# Patient Record
Sex: FEMALE | Race: WHITE | NOT HISPANIC OR LATINO | Employment: OTHER | URBAN - METROPOLITAN AREA
[De-identification: names, ages, dates, MRNs, and addresses within clinical notes are randomized per-mention and may not be internally consistent; named-entity substitution may affect disease eponyms.]

---

## 2017-06-28 DIAGNOSIS — R01.1 HEART MURMUR: Primary | ICD-10-CM

## 2017-07-07 ENCOUNTER — HOSPITAL ENCOUNTER (OUTPATIENT)
Dept: CARDIOLOGY | Facility: HOSPITAL | Age: 72
Discharge: HOME | End: 2017-07-07
Payer: MEDICARE

## 2017-07-07 DIAGNOSIS — R01.1 HEART MURMUR: ICD-10-CM

## 2017-07-07 LAB
AORTIC VALVE STENOSIS: ABNORMAL
DIASTOLIC DYSFUNCTION: YES
ESTIMATED PA SYSTOLIC PRESSURE: 30.88
MITRAL VALVE MOBILITY: NORMAL
RETIRED EF AND QEF - SEE NOTES: 65 (ref 55–65)
TRICUSPID VALVE REGURGITATION: ABNORMAL

## 2017-07-07 PROCEDURE — 93306 TTE W/DOPPLER COMPLETE: CPT | Mod: 26,,, | Performed by: INTERNAL MEDICINE

## 2017-07-07 PROCEDURE — 93306 TTE W/DOPPLER COMPLETE: CPT | Mod: PO

## 2018-09-26 ENCOUNTER — HOSPITAL ENCOUNTER (INPATIENT)
Facility: HOSPITAL | Age: 73
LOS: 3 days | Discharge: HOME | DRG: 871 | End: 2018-09-29
Attending: FAMILY MEDICINE | Admitting: INTERNAL MEDICINE
Payer: MEDICARE

## 2018-09-26 ENCOUNTER — ANESTHESIA (OUTPATIENT)
Dept: SURGERY | Facility: HOSPITAL | Age: 73
DRG: 871 | End: 2018-09-26
Payer: MEDICARE

## 2018-09-26 ENCOUNTER — ANESTHESIA EVENT (OUTPATIENT)
Dept: SURGERY | Facility: HOSPITAL | Age: 73
DRG: 871 | End: 2018-09-26
Payer: MEDICARE

## 2018-09-26 DIAGNOSIS — A41.9 SEVERE SEPSIS WITH ACUTE ORGAN DYSFUNCTION: ICD-10-CM

## 2018-09-26 DIAGNOSIS — N17.9 AKI (ACUTE KIDNEY INJURY): ICD-10-CM

## 2018-09-26 DIAGNOSIS — R65.20 SEVERE SEPSIS WITH ACUTE ORGAN DYSFUNCTION: ICD-10-CM

## 2018-09-26 DIAGNOSIS — A41.9 SEPSIS ASSOCIATED HYPOTENSION: ICD-10-CM

## 2018-09-26 DIAGNOSIS — R78.81 BACTEREMIA: ICD-10-CM

## 2018-09-26 DIAGNOSIS — I95.9 SEPSIS ASSOCIATED HYPOTENSION: ICD-10-CM

## 2018-09-26 DIAGNOSIS — N39.0 E-COLI UTI: ICD-10-CM

## 2018-09-26 DIAGNOSIS — E87.6 HYPOKALEMIA: ICD-10-CM

## 2018-09-26 DIAGNOSIS — A41.9 SEPSIS, DUE TO UNSPECIFIED ORGANISM: Primary | ICD-10-CM

## 2018-09-26 DIAGNOSIS — R78.81 GRAM-NEGATIVE BACTEREMIA: ICD-10-CM

## 2018-09-26 DIAGNOSIS — N30.01 ACUTE CYSTITIS WITH HEMATURIA: ICD-10-CM

## 2018-09-26 DIAGNOSIS — I95.9 HYPOTENSION: ICD-10-CM

## 2018-09-26 DIAGNOSIS — N13.30 HYDRONEPHROSIS, RIGHT: ICD-10-CM

## 2018-09-26 DIAGNOSIS — F32.A DEPRESSION, UNSPECIFIED DEPRESSION TYPE: ICD-10-CM

## 2018-09-26 DIAGNOSIS — N39.0 URINARY TRACT INFECTION WITHOUT HEMATURIA, SITE UNSPECIFIED: ICD-10-CM

## 2018-09-26 DIAGNOSIS — N20.1 OBSTRUCTION OF RIGHT URETEROPELVIC JUNCTION (UPJ) DUE TO STONE: ICD-10-CM

## 2018-09-26 DIAGNOSIS — R78.81 E COLI BACTEREMIA: ICD-10-CM

## 2018-09-26 DIAGNOSIS — B96.20 E-COLI UTI: ICD-10-CM

## 2018-09-26 DIAGNOSIS — B96.20 E COLI BACTEREMIA: ICD-10-CM

## 2018-09-26 DIAGNOSIS — K21.9 GASTROESOPHAGEAL REFLUX DISEASE, ESOPHAGITIS PRESENCE NOT SPECIFIED: ICD-10-CM

## 2018-09-26 DIAGNOSIS — K44.9 HIATAL HERNIA: ICD-10-CM

## 2018-09-26 DIAGNOSIS — I35.0 NONRHEUMATIC AORTIC VALVE STENOSIS: ICD-10-CM

## 2018-09-26 LAB
ALBUMIN SERPL BCP-MCNC: 3.5 G/DL
ALLENS TEST: ABNORMAL
ALP SERPL-CCNC: 289 U/L
ALT SERPL W/O P-5'-P-CCNC: 33 U/L
ANION GAP SERPL CALC-SCNC: 13 MMOL/L
ANISOCYTOSIS BLD QL SMEAR: SLIGHT
AST SERPL-CCNC: 41 U/L
BACTERIA #/AREA URNS AUTO: ABNORMAL /HPF
BASOPHILS NFR BLD: 0 %
BILIRUB SERPL-MCNC: 1 MG/DL
BILIRUB UR QL STRIP: NEGATIVE
BUN SERPL-MCNC: 17 MG/DL
CALCIUM SERPL-MCNC: 8.8 MG/DL
CHLORIDE SERPL-SCNC: 104 MMOL/L
CLARITY UR REFRACT.AUTO: ABNORMAL
CO2 SERPL-SCNC: 20 MMOL/L
COLOR UR AUTO: YELLOW
CREAT SERPL-MCNC: 1.04 MG/DL
CREAT UR-MCNC: 26.7 MG/DL
DELSYS: ABNORMAL
DIFFERENTIAL METHOD: ABNORMAL
EOSINOPHIL NFR BLD: 1 %
ERYTHROCYTE [DISTWIDTH] IN BLOOD BY AUTOMATED COUNT: 15 %
EST. GFR  (AFRICAN AMERICAN): >60 ML/MIN/1.73 M^2
EST. GFR  (NON AFRICAN AMERICAN): 53.4 ML/MIN/1.73 M^2
FLUAV AG SPEC QL IA: NEGATIVE
FLUBV AG SPEC QL IA: NEGATIVE
GLUCOSE SERPL-MCNC: 116 MG/DL
GLUCOSE UR QL STRIP: NEGATIVE
HCO3 UR-SCNC: 20.6 MMOL/L (ref 24–28)
HCT VFR BLD AUTO: 37.5 %
HGB BLD-MCNC: 12.7 G/DL
HGB UR QL STRIP: ABNORMAL
HYALINE CASTS UR QL AUTO: 0 /LPF
KETONES UR QL STRIP: NEGATIVE
LACTATE SERPL-SCNC: 1.2 MMOL/L
LACTATE SERPL-SCNC: 4.1 MMOL/L
LEUKOCYTE ESTERASE UR QL STRIP: ABNORMAL
LYMPHOCYTES NFR BLD: 14 %
MCH RBC QN AUTO: 29.9 PG
MCHC RBC AUTO-ENTMCNC: 33.9 G/DL
MCV RBC AUTO: 88 FL
METAMYELOCYTES NFR BLD MANUAL: 4 %
MICROSCOPIC COMMENT: ABNORMAL
MONOCYTES NFR BLD: 10 %
MYELOCYTES NFR BLD MANUAL: 2 %
NEUTROPHILS NFR BLD: 42 %
NEUTS BAND NFR BLD MANUAL: 27 %
NITRITE UR QL STRIP: NEGATIVE
PCO2 BLDA: 30.4 MMHG (ref 35–45)
PH SMN: 7.44 [PH] (ref 7.35–7.45)
PH UR STRIP: 6 [PH] (ref 5–8)
PLATELET # BLD AUTO: 153 K/UL
PMV BLD AUTO: 10.9 FL
PO2 BLDA: 34 MMHG (ref 40–60)
POC BE: -4 MMOL/L
POC SATURATED O2: 70 % (ref 95–100)
POC TCO2: 22 MMOL/L (ref 24–29)
POTASSIUM SERPL-SCNC: 3.6 MMOL/L
PROT SERPL-MCNC: 6.5 G/DL
PROT UR QL STRIP: ABNORMAL
RBC # BLD AUTO: 4.25 M/UL
RBC #/AREA URNS AUTO: 30 /HPF (ref 0–4)
SAMPLE: ABNORMAL
SITE: ABNORMAL
SODIUM SERPL-SCNC: 137 MMOL/L
SODIUM UR-SCNC: 63 MMOL/L
SP GR UR STRIP: 1.01 (ref 1–1.03)
SPECIMEN SOURCE: NORMAL
TROPONIN I SERPL DL<=0.01 NG/ML-MCNC: <0.012 NG/ML
URN SPEC COLLECT METH UR: ABNORMAL
UROBILINOGEN UR STRIP-ACNC: 1 EU/DL
WBC # BLD AUTO: 1.6 K/UL
WBC #/AREA URNS AUTO: 20 /HPF (ref 0–5)

## 2018-09-26 PROCEDURE — 63600175 PHARM REV CODE 636 W HCPCS: Performed by: STUDENT IN AN ORGANIZED HEALTH CARE EDUCATION/TRAINING PROGRAM

## 2018-09-26 PROCEDURE — 84484 ASSAY OF TROPONIN QUANT: CPT

## 2018-09-26 PROCEDURE — 85007 BL SMEAR W/DIFF WBC COUNT: CPT

## 2018-09-26 PROCEDURE — 0T768DZ DILATION OF RIGHT URETER WITH INTRALUMINAL DEVICE, VIA NATURAL OR ARTIFICIAL OPENING ENDOSCOPIC: ICD-10-PCS | Performed by: STUDENT IN AN ORGANIZED HEALTH CARE EDUCATION/TRAINING PROGRAM

## 2018-09-26 PROCEDURE — 87077 CULTURE AEROBIC IDENTIFY: CPT | Mod: 59

## 2018-09-26 PROCEDURE — 74420 UROGRAPHY RTRGR +-KUB: CPT | Mod: 26,,, | Performed by: STUDENT IN AN ORGANIZED HEALTH CARE EDUCATION/TRAINING PROGRAM

## 2018-09-26 PROCEDURE — 99900035 HC TECH TIME PER 15 MIN (STAT)

## 2018-09-26 PROCEDURE — 25000003 PHARM REV CODE 250: Performed by: FAMILY MEDICINE

## 2018-09-26 PROCEDURE — 87186 SC STD MICRODIL/AGAR DIL: CPT | Mod: 59

## 2018-09-26 PROCEDURE — BT1D1ZZ FLUOROSCOPY OF RIGHT KIDNEY, URETER AND BLADDER USING LOW OSMOLAR CONTRAST: ICD-10-PCS | Performed by: STUDENT IN AN ORGANIZED HEALTH CARE EDUCATION/TRAINING PROGRAM

## 2018-09-26 PROCEDURE — 87086 URINE CULTURE/COLONY COUNT: CPT

## 2018-09-26 PROCEDURE — 80053 COMPREHEN METABOLIC PANEL: CPT

## 2018-09-26 PROCEDURE — 37000008 HC ANESTHESIA 1ST 15 MINUTES: Performed by: STUDENT IN AN ORGANIZED HEALTH CARE EDUCATION/TRAINING PROGRAM

## 2018-09-26 PROCEDURE — 25000003 PHARM REV CODE 250: Performed by: NURSE ANESTHETIST, CERTIFIED REGISTERED

## 2018-09-26 PROCEDURE — 99223 1ST HOSP IP/OBS HIGH 75: CPT | Mod: 25,,, | Performed by: STUDENT IN AN ORGANIZED HEALTH CARE EDUCATION/TRAINING PROGRAM

## 2018-09-26 PROCEDURE — 36000706: Performed by: STUDENT IN AN ORGANIZED HEALTH CARE EDUCATION/TRAINING PROGRAM

## 2018-09-26 PROCEDURE — 87400 INFLUENZA A/B EACH AG IA: CPT

## 2018-09-26 PROCEDURE — 25500020 PHARM REV CODE 255: Performed by: STUDENT IN AN ORGANIZED HEALTH CARE EDUCATION/TRAINING PROGRAM

## 2018-09-26 PROCEDURE — 63600175 PHARM REV CODE 636 W HCPCS: Performed by: FAMILY MEDICINE

## 2018-09-26 PROCEDURE — 37000009 HC ANESTHESIA EA ADD 15 MINS: Performed by: STUDENT IN AN ORGANIZED HEALTH CARE EDUCATION/TRAINING PROGRAM

## 2018-09-26 PROCEDURE — 25000003 PHARM REV CODE 250: Performed by: STUDENT IN AN ORGANIZED HEALTH CARE EDUCATION/TRAINING PROGRAM

## 2018-09-26 PROCEDURE — 94760 N-INVAS EAR/PLS OXIMETRY 1: CPT

## 2018-09-26 PROCEDURE — 85027 COMPLETE CBC AUTOMATED: CPT

## 2018-09-26 PROCEDURE — 96365 THER/PROPH/DIAG IV INF INIT: CPT

## 2018-09-26 PROCEDURE — 27000221 HC OXYGEN, UP TO 24 HOURS

## 2018-09-26 PROCEDURE — 93010 ELECTROCARDIOGRAM REPORT: CPT | Mod: ,,, | Performed by: INTERNAL MEDICINE

## 2018-09-26 PROCEDURE — 52332 CYSTOSCOPY AND TREATMENT: CPT | Mod: RT,,, | Performed by: STUDENT IN AN ORGANIZED HEALTH CARE EDUCATION/TRAINING PROGRAM

## 2018-09-26 PROCEDURE — 36000707: Performed by: STUDENT IN AN ORGANIZED HEALTH CARE EDUCATION/TRAINING PROGRAM

## 2018-09-26 PROCEDURE — 87040 BLOOD CULTURE FOR BACTERIA: CPT | Mod: 59

## 2018-09-26 PROCEDURE — 20000000 HC ICU ROOM

## 2018-09-26 PROCEDURE — 87088 URINE BACTERIA CULTURE: CPT

## 2018-09-26 PROCEDURE — 99285 EMERGENCY DEPT VISIT HI MDM: CPT | Mod: 25

## 2018-09-26 PROCEDURE — 81000 URINALYSIS NONAUTO W/SCOPE: CPT

## 2018-09-26 PROCEDURE — 82803 BLOOD GASES ANY COMBINATION: CPT

## 2018-09-26 PROCEDURE — 63600175 PHARM REV CODE 636 W HCPCS: Performed by: NURSE ANESTHETIST, CERTIFIED REGISTERED

## 2018-09-26 PROCEDURE — 96375 TX/PRO/DX INJ NEW DRUG ADDON: CPT

## 2018-09-26 PROCEDURE — 93005 ELECTROCARDIOGRAM TRACING: CPT

## 2018-09-26 PROCEDURE — 82570 ASSAY OF URINE CREATININE: CPT

## 2018-09-26 PROCEDURE — 84300 ASSAY OF URINE SODIUM: CPT

## 2018-09-26 PROCEDURE — C1769 GUIDE WIRE: HCPCS | Performed by: STUDENT IN AN ORGANIZED HEALTH CARE EDUCATION/TRAINING PROGRAM

## 2018-09-26 PROCEDURE — 76000 FLUOROSCOPY <1 HR PHYS/QHP: CPT | Mod: 26,59,, | Performed by: STUDENT IN AN ORGANIZED HEALTH CARE EDUCATION/TRAINING PROGRAM

## 2018-09-26 PROCEDURE — C2617 STENT, NON-COR, TEM W/O DEL: HCPCS | Performed by: STUDENT IN AN ORGANIZED HEALTH CARE EDUCATION/TRAINING PROGRAM

## 2018-09-26 PROCEDURE — 83605 ASSAY OF LACTIC ACID: CPT | Mod: 91

## 2018-09-26 DEVICE — STENT SET URETERAL 6X26CM: Type: IMPLANTABLE DEVICE | Site: URETER | Status: FUNCTIONAL

## 2018-09-26 RX ORDER — ENOXAPARIN SODIUM 100 MG/ML
40 INJECTION SUBCUTANEOUS EVERY 24 HOURS
Status: DISCONTINUED | OUTPATIENT
Start: 2018-09-26 | End: 2018-09-29 | Stop reason: HOSPADM

## 2018-09-26 RX ORDER — ONDANSETRON 2 MG/ML
4 INJECTION INTRAMUSCULAR; INTRAVENOUS
Status: COMPLETED | OUTPATIENT
Start: 2018-09-26 | End: 2018-09-26

## 2018-09-26 RX ORDER — PROPOFOL 10 MG/ML
VIAL (ML) INTRAVENOUS
Status: DISCONTINUED | OUTPATIENT
Start: 2018-09-26 | End: 2018-09-26

## 2018-09-26 RX ORDER — ENOXAPARIN SODIUM 100 MG/ML
40 INJECTION SUBCUTANEOUS EVERY 24 HOURS
Status: DISCONTINUED | OUTPATIENT
Start: 2018-09-26 | End: 2018-09-26

## 2018-09-26 RX ORDER — PANTOPRAZOLE SODIUM 40 MG/10ML
40 INJECTION, POWDER, LYOPHILIZED, FOR SOLUTION INTRAVENOUS DAILY
Status: DISCONTINUED | OUTPATIENT
Start: 2018-09-26 | End: 2018-09-26

## 2018-09-26 RX ORDER — ONDANSETRON 2 MG/ML
4 INJECTION INTRAMUSCULAR; INTRAVENOUS DAILY PRN
Status: DISCONTINUED | OUTPATIENT
Start: 2018-09-26 | End: 2018-09-29 | Stop reason: HOSPADM

## 2018-09-26 RX ORDER — KETOROLAC TROMETHAMINE 30 MG/ML
15 INJECTION, SOLUTION INTRAMUSCULAR; INTRAVENOUS EVERY 6 HOURS PRN
Status: DISCONTINUED | OUTPATIENT
Start: 2018-09-26 | End: 2018-09-26

## 2018-09-26 RX ORDER — CEFTRIAXONE 1 G/1
1 INJECTION, POWDER, FOR SOLUTION INTRAMUSCULAR; INTRAVENOUS
Status: DISCONTINUED | OUTPATIENT
Start: 2018-09-26 | End: 2018-09-26

## 2018-09-26 RX ORDER — IBUPROFEN 400 MG/1
400 TABLET ORAL EVERY 6 HOURS PRN
Status: DISCONTINUED | OUTPATIENT
Start: 2018-09-26 | End: 2018-09-26

## 2018-09-26 RX ORDER — MORPHINE SULFATE 2 MG/ML
2 INJECTION, SOLUTION INTRAMUSCULAR; INTRAVENOUS EVERY 6 HOURS PRN
Status: DISCONTINUED | OUTPATIENT
Start: 2018-09-26 | End: 2018-09-26

## 2018-09-26 RX ORDER — VENLAFAXINE 37.5 MG/1
150 TABLET ORAL 2 TIMES DAILY
Status: DISCONTINUED | OUTPATIENT
Start: 2018-09-26 | End: 2018-09-29 | Stop reason: HOSPADM

## 2018-09-26 RX ORDER — QUETIAPINE FUMARATE 100 MG/1
100 TABLET, FILM COATED ORAL DAILY
Status: DISCONTINUED | OUTPATIENT
Start: 2018-09-26 | End: 2018-09-27

## 2018-09-26 RX ORDER — ASPIRIN 325 MG
325 TABLET ORAL DAILY
COMMUNITY

## 2018-09-26 RX ORDER — HYDROMORPHONE HYDROCHLORIDE 2 MG/ML
0.5 INJECTION, SOLUTION INTRAMUSCULAR; INTRAVENOUS; SUBCUTANEOUS EVERY 5 MIN PRN
Status: DISCONTINUED | OUTPATIENT
Start: 2018-09-26 | End: 2018-09-29 | Stop reason: HOSPADM

## 2018-09-26 RX ORDER — SODIUM CHLORIDE 0.9 % (FLUSH) 0.9 %
3 SYRINGE (ML) INJECTION
Status: DISCONTINUED | OUTPATIENT
Start: 2018-09-26 | End: 2018-09-29 | Stop reason: HOSPADM

## 2018-09-26 RX ORDER — KETOROLAC TROMETHAMINE 30 MG/ML
15 INJECTION, SOLUTION INTRAMUSCULAR; INTRAVENOUS
Status: COMPLETED | OUTPATIENT
Start: 2018-09-26 | End: 2018-09-26

## 2018-09-26 RX ORDER — VANCOMYCIN HCL IN 5 % DEXTROSE 1G/250ML
1000 PLASTIC BAG, INJECTION (ML) INTRAVENOUS ONCE
Status: COMPLETED | OUTPATIENT
Start: 2018-09-26 | End: 2018-09-26

## 2018-09-26 RX ORDER — PROPOFOL 10 MG/ML
VIAL (ML) INTRAVENOUS CONTINUOUS PRN
Status: DISCONTINUED | OUTPATIENT
Start: 2018-09-26 | End: 2018-09-26

## 2018-09-26 RX ORDER — SODIUM CHLORIDE 9 MG/ML
INJECTION, SOLUTION INTRAVENOUS CONTINUOUS
Status: DISCONTINUED | OUTPATIENT
Start: 2018-09-26 | End: 2018-09-29 | Stop reason: HOSPADM

## 2018-09-26 RX ORDER — LIDOCAINE HCL/PF 100 MG/5ML
SYRINGE (ML) INTRAVENOUS
Status: DISCONTINUED | OUTPATIENT
Start: 2018-09-26 | End: 2018-09-26

## 2018-09-26 RX ORDER — SODIUM CHLORIDE 9 MG/ML
1000 INJECTION, SOLUTION INTRAVENOUS
Status: COMPLETED | OUTPATIENT
Start: 2018-09-26 | End: 2018-09-26

## 2018-09-26 RX ORDER — MORPHINE SULFATE 2 MG/ML
2 INJECTION, SOLUTION INTRAMUSCULAR; INTRAVENOUS EVERY 6 HOURS PRN
Status: DISCONTINUED | OUTPATIENT
Start: 2018-09-26 | End: 2018-09-29 | Stop reason: HOSPADM

## 2018-09-26 RX ORDER — SODIUM CHLORIDE 9 MG/ML
INJECTION, SOLUTION INTRAVENOUS CONTINUOUS PRN
Status: DISCONTINUED | OUTPATIENT
Start: 2018-09-26 | End: 2018-09-26

## 2018-09-26 RX ORDER — ONDANSETRON 2 MG/ML
4 INJECTION INTRAMUSCULAR; INTRAVENOUS EVERY 8 HOURS PRN
Status: DISCONTINUED | OUTPATIENT
Start: 2018-09-26 | End: 2018-09-29 | Stop reason: HOSPADM

## 2018-09-26 RX ORDER — DIPHENHYDRAMINE HYDROCHLORIDE 50 MG/ML
12.5 INJECTION INTRAMUSCULAR; INTRAVENOUS EVERY 6 HOURS PRN
Status: DISCONTINUED | OUTPATIENT
Start: 2018-09-26 | End: 2018-09-29 | Stop reason: HOSPADM

## 2018-09-26 RX ORDER — TRAMADOL HYDROCHLORIDE 50 MG/1
50 TABLET ORAL EVERY 6 HOURS PRN
Status: DISCONTINUED | OUTPATIENT
Start: 2018-09-26 | End: 2018-09-29 | Stop reason: HOSPADM

## 2018-09-26 RX ORDER — VANCOMYCIN HCL IN 5 % DEXTROSE 1G/250ML
PLASTIC BAG, INJECTION (ML) INTRAVENOUS
Status: COMPLETED
Start: 2018-09-26 | End: 2018-09-26

## 2018-09-26 RX ADMIN — ONDANSETRON 4 MG: 2 INJECTION INTRAMUSCULAR; INTRAVENOUS at 07:09

## 2018-09-26 RX ADMIN — PROPOFOL 50 MG: 10 INJECTION, EMULSION INTRAVENOUS at 03:09

## 2018-09-26 RX ADMIN — QUETIAPINE FUMARATE 100 MG: 100 TABLET ORAL at 03:09

## 2018-09-26 RX ADMIN — SODIUM CHLORIDE: 0.9 INJECTION, SOLUTION INTRAVENOUS at 02:09

## 2018-09-26 RX ADMIN — PIPERACILLIN AND TAZOBACTAM 4.5 G: 4; .5 INJECTION, POWDER, LYOPHILIZED, FOR SOLUTION INTRAVENOUS; PARENTERAL at 11:09

## 2018-09-26 RX ADMIN — PIPERACILLIN AND TAZOBACTAM 4.5 G: 4; .5 INJECTION, POWDER, LYOPHILIZED, FOR SOLUTION INTRAVENOUS; PARENTERAL at 03:09

## 2018-09-26 RX ADMIN — PROPOFOL 150 MCG/KG/MIN: 10 INJECTION, EMULSION INTRAVENOUS at 03:09

## 2018-09-26 RX ADMIN — TRAMADOL HYDROCHLORIDE 50 MG: 50 TABLET, COATED ORAL at 08:09

## 2018-09-26 RX ADMIN — ENOXAPARIN SODIUM 40 MG: 100 INJECTION SUBCUTANEOUS at 05:09

## 2018-09-26 RX ADMIN — PIPERACILLIN AND TAZOBACTAM 4.5 G: 4; .5 INJECTION, POWDER, LYOPHILIZED, FOR SOLUTION INTRAVENOUS; PARENTERAL at 07:09

## 2018-09-26 RX ADMIN — PROPOFOL 30 MG: 10 INJECTION, EMULSION INTRAVENOUS at 03:09

## 2018-09-26 RX ADMIN — LIDOCAINE HYDROCHLORIDE 50 MG: 20 INJECTION, SOLUTION INTRAVENOUS at 03:09

## 2018-09-26 RX ADMIN — SODIUM CHLORIDE 1632 ML: 0.9 INJECTION, SOLUTION INTRAVENOUS at 07:09

## 2018-09-26 RX ADMIN — SODIUM CHLORIDE 1000 ML: 0.9 INJECTION, SOLUTION INTRAVENOUS at 09:09

## 2018-09-26 RX ADMIN — SODIUM CHLORIDE: 0.9 INJECTION, SOLUTION INTRAVENOUS at 03:09

## 2018-09-26 RX ADMIN — TRAMADOL HYDROCHLORIDE 50 MG: 50 TABLET, COATED ORAL at 03:09

## 2018-09-26 RX ADMIN — VENLAFAXINE 150 MG: 37.5 TABLET ORAL at 08:09

## 2018-09-26 RX ADMIN — KETOROLAC TROMETHAMINE 15 MG: 30 INJECTION, SOLUTION INTRAMUSCULAR at 12:09

## 2018-09-26 RX ADMIN — SODIUM CHLORIDE: 0.9 INJECTION, SOLUTION INTRAVENOUS at 08:09

## 2018-09-26 RX ADMIN — ONDANSETRON 4 MG: 2 INJECTION INTRAMUSCULAR; INTRAVENOUS at 10:09

## 2018-09-26 NOTE — ANESTHESIA POSTPROCEDURE EVALUATION
"Anesthesia Post Evaluation    Patient: Viv Srivastava    Procedure(s) Performed: Procedure(s) (LRB):  CYSTOSCOPY, WITH Right URETERAL STENT INSERTION, Right Retrograde Pyelogram (Right)    Final Anesthesia Type: general  Patient location during evaluation: PACU  Patient participation: Yes- Able to Participate  Level of consciousness: awake and alert, oriented and awake  Post-procedure vital signs: reviewed and stable  Pain management: adequate  Airway patency: patent  PONV status at discharge: No PONV  Anesthetic complications: no      Cardiovascular status: blood pressure returned to baseline  Respiratory status: unassisted and room air  Hydration status: euvolemic  Follow-up not needed.        Visit Vitals  BP (!) 152/86   Pulse 75   Temp 36.5 °C (97.7 °F) (Oral)   Resp 17   Ht 5' 7" (1.702 m)   Wt 60.9 kg (134 lb 4.2 oz)   SpO2 96%   Breastfeeding? No   BMI 21.03 kg/m²       Pain/Maria Eugenia Score: Pain Assessment Performed: Yes (9/26/2018  5:07 PM)  Presence of Pain: denies (9/26/2018  5:07 PM)  Pain Rating Prior to Med Admin: 5 (9/26/2018  3:03 PM)  Pain Rating Post Med Admin: 2 (9/26/2018  3:33 PM)        "

## 2018-09-26 NOTE — TRANSFER OF CARE
"Anesthesia Transfer of Care Note    Patient: Viv Srivastava    Procedure(s) Performed: Procedure(s) (LRB):  CYSTOSCOPY, WITH Right URETERAL STENT INSERTION, Right Retrograde Pyelogram (Right)    Patient location: ICU    Anesthesia Type: MAC    Transport from OR: Transported from OR on room air with adequate spontaneous ventilation. Continuous ECG monitoring in transport. Continuous SpO2 monitoring in transport    Post pain: adequate analgesia    Post assessment: no apparent anesthetic complications    Post vital signs: stable    Level of consciousness: responds to stimulation and sedated (responds to verbal stimuli)    Nausea/Vomiting: no nausea/vomiting    Complications: none    Transfer of care protocol was followed      Last vitals:   Visit Vitals  BP (!) 159/92   Pulse 72   Temp 36.7 °C (98 °F) (Axillary)   Resp 20   Ht 5' 7" (1.702 m)   Wt 60.9 kg (134 lb 4.2 oz)   SpO2 98%   Breastfeeding? No   BMI 21.03 kg/m²     "

## 2018-09-26 NOTE — ASSESSMENT & PLAN NOTE
72 y.o. female with right flank pain, 6mm proximal ureteral stone, proximal hydroureteronephrosis, abnormal WBC count, abnormal urinalysis suspicious for a UTI    Plan:  1. I have explained the indication and benefits of proceeding with a cystoscopy, right ureteral stent placement, possible retrograde pyelogram with me in the operating room. Alternatives of the procedure were also discussed which included continued observation, IV fluids and antibiotics. The risks of waiting include worsening infection, sepsis, and end organ damage.      The risks of the procedure included but were not limited to pain, infection (urinary tract infection), bleeding (hematuria), ureteral or urethral stricture,  injury to the urethra, bladder, ureter, pain, and discomfort related to the stent were discussed in depth with the patient.      The ureteral stent was discussed at length. It is meant to be only a temporizing measure until we proceed with definitive stone management. If left indwelling, the sequelae include pain, infection, lower urinary tract symptoms, development of calcifications on the ureteral stent, worsening kidney function, and complete loss of kidney function.      2. Continue to monitor for signs/sx of sepsis postop.  3. followup urine culture.  4. Will place a brush after stent procedure that will likely be able to be removed prior to discharge.

## 2018-09-26 NOTE — OP NOTE
OPERATIVE DICTATION  DATE OF OPERATION: 9/26/2018    SERVICE: Urology    SURGEONS:  1. Irma Gill MD    ANESTHESIA:  Anesthesiologist: Logan Nair MD  CRNA: Francine Avalos CRNA    STAFF:  * No surgical staff found *    ANESTHESIA: Local MAC    PREOPERATIVE DIAGNOSIS: Pre-Op Diagnosis Codes:     * right proximal ureteral obstructing calculus     * Hydronephrosis, right [N13.30]     * Hypotension [I95.9]     * Sepsis, due to unspecified organism [A41.9]     * Urinary tract infection without hematuria, site unspecified [N39.0]    POSTOPERATIVE DIAGNOSIS: Post-Op Diagnosis Codes:     * right proximal ureteral obstructing calculus     * Hydronephrosis, right [N13.30]     * Hypotension [I95.9]     * Sepsis, due to unspecified organism [A41.9]     * Urinary tract infection without hematuria, site unspecified [N39.0]    PROCEDURES:   1. Cystoscopy, right 6 Puerto Rican x 26 cm JJ ureteral stent placement OFF A STRING  2. Cystoscopy, right retrograde pyelogram  3. Fluoroscopy < 1hour  4. Interpretation of fluoroscopic images  5. Placement of 16 Puerto Rican urethral brush      COMPLICATIONS: * No complications entered in OR log *    DRAINS: None    TUBES: 16 Puerto Rican urethral brush catheter    IMPLANTS: right 6 Puerto Rican x 26 cm JJ ureteral stent OFF A STRING    FLUIDS: see anesthesia record     ESTIMATED BLOOD LOSS: None    FINDINGS:   1. Right lower riding kidney, ureteral calculus not evidence on fluoroscopy.  2. Right retrograde pyelogram confirmed that guidewire was coiled in the right renal pelvis  3. Right ureteral stent placed without any difficulty    SPECIMEN(S):   None    CONDITION: stable    INDICATIONS FOR THE PROCEDURE:  72 y.o. female with past medical history below with her first episode of kidney stones. She states she has never had kidney stones to her knowledge prior. She was experiencing worsening right flank pain and presented to [facility] ER. She also notes subjective chills.     Obstruction of right  ureteropelvic junction (UPJ) due to stone     72 y.o. female with right flank pain, 6mm proximal ureteral stone, proximal hydroureteronephrosis, abnormal WBC count, abnormal urinalysis suspicious for a UTI     Plan:  1. I have explained the indication and benefits of proceeding with a cystoscopy, right ureteral stent placement, possible retrograde pyelogram with me in the operating room. Alternatives of the procedure were also discussed which included continued observation, IV fluids and antibiotics. The risks of waiting include worsening infection, sepsis, and end organ damage.       The risks of the procedure included but were not limited to pain, infection (urinary tract infection), bleeding (hematuria), ureteral or urethral stricture,  injury to the urethra, bladder, ureter, pain, and discomfort related to the stent were discussed in depth with the patient.       The ureteral stent was discussed at length. It is meant to be only a temporizing measure until we proceed with definitive stone management. If left indwelling, the sequelae include pain, infection, lower urinary tract symptoms, development of calcifications on the ureteral stent, worsening kidney function, and complete loss of kidney function.      2. Continue to monitor for signs/sx of sepsis postop.  3. followup urine culture.  4. Will place a brush after stent procedure that will likely be able to be removed prior to discharge.      PROCEDURE IN DETAIL:  After appropriate informed consent was obtained, the patient was taken to the operating room and placed in the supine position. After induction of General, she was placed in the dorsal lithotomy position.  Then she was prepped and draped in the usual sterile fashion.     Thereafter a WHO timeout was performed and the procedure was initiated. The rigid 22 Croatian cystoscope was inserted into the bladder via the urethra.     I visualized the right ureteral orifice and cannulated it with a Motion  guidewire. I advanced the wire until I noted a coil in the renal pelvis fluoroscopically.     The coil in the right renal pelvis appeared to be consistent with a low riding kidney however I proceeded with a light retrograde pyelogram on the right to confirm that the guidewire was in the right renal pelvis. To perform the  right retrograde pyelogram I injected dilute isovue through a 5 Salvadorean open ended catheter that I guided into the right renal pelvis over the guidewire. The right retrograde pyelogram revealed that the guidewire was in the right renal pelvis and demonstrated hydronephrosis. The guidewire was re-advanced through the 5 Salvadorean open ended catheter and the 5 frenhc was removed.     Then we proceeded with the right ureteral stent placement. A 6 Senegalese x 26 cm JJ ureteral stent was advanced over the guidewire. Using the radiopaque marker of the pusher the stent was positioned in the correct location. As the guidewire was being removed, the right proximal coil in the renal pelvis was formed and visualized fluoroscopically. A coil in the bladder was also noted fluoroscopically.     A 16 Salvadorean urethral brush was inserted into the bladder and inflated with 10cc of sterile saline and this concluded the procedure.     ATTENDING ATTESTATION  I was present and scrubbed for the entire duration  of the procedure.      CASE DURATION:  * Missing case tracking time(s) *    DISPOSITION:   The patient tolerated the procedure well, she was extubated, and taken to post-anesthesia care unit in satisfactory condition.  The patient will be transferred back to her primary team, I will continue to monitor her pain improvement, vital signs, and labs. Likely once her condition improves the urethral brush can be removed prior to discharge. I will make plans for her to meet up with me in the clinic to discuss definitive stone management.     Irma Gill MD

## 2018-09-26 NOTE — ANESTHESIA PREPROCEDURE EVALUATION
09/26/2018  Viv Srivastava is a 72 y.o., female presents for cysto under GA.    Closed fracture of proximal end of left humerus   Severe sepsis with acute organ dysfunction   Hydronephrosis, right   Acute cystitis with hematuria   Obstruction of right ureteropelvic junction (UPJ) due to stone   Depression   YESSICA (acute kidney injury)   Sepsis associated hypotension   Severe sepsis         Anesthesia Evaluation    I have reviewed the Patient Summary Reports.    I have reviewed the Nursing Notes.   I have reviewed the Medications.     Review of Systems  Anesthesia Hx:  No problems with previous Anesthesia    Hematology/Oncology:  Hematology Normal   Oncology Normal     EENT/Dental:EENT/Dental Normal   Cardiovascular:  Cardiovascular Normal     Pulmonary:  Pulmonary Normal    Renal/:   Chronic Renal Disease    Neurological:  Neurology Normal    Psych:   Psychiatric History          Physical Exam  General:  Well nourished    Airway/Jaw/Neck:  Airway Findings: Mouth Opening: Normal Tongue: Normal  General Airway Assessment: Adult  Mallampati: II  TM Distance: Normal, at least 6 cm     Eyes/Ears/Nose:  Eyes/Ears/Nose Findings:    Dental:  Dental Findings:    Chest/Lungs:  Chest/Lungs Findings: Clear to auscultation, Normal Respiratory Rate     Heart/Vascular:  Heart Findings: Rate: Normal  Rhythm: Regular Rhythm        Mental Status:  Mental Status Findings:  Cooperative, Alert and Oriented         Anesthesia Plan  Type of Anesthesia, risks & benefits discussed:  Anesthesia Type:  general  Patient's Preference:   Intra-op Monitoring Plan: standard ASA monitors  Intra-op Monitoring Plan Comments:   Post Op Pain Control Plan: per primary service following discharge from PACU  Post Op Pain Control Plan Comments:   Induction:   IV  Beta Blocker:         Informed Consent: Patient understands risks and agrees with  Anesthesia plan.  Questions answered. Anesthesia consent signed with patient.  ASA Score: 3  emergent   Day of Surgery Review of History & Physical:  There are no significant changes.          Ready For Surgery From Anesthesia Perspective.

## 2018-09-26 NOTE — MEDICAL/APP STUDENT
[facility] Medicine Team A Admission Note - L3 Medical Student  Patient: Viv Srivastava  MRN: [id number]  : 1945  Admitted 18    Subjective  CC: R flank pain and chills    Viv Srivastava is a 71 yo with past medical history of depression who initially presented to our ED morning of  complaining of R sided flank pain. She reports the pain initially began 5 days prior to presentation and temporarily improved 2 days after onset. She reports associated chills, full body aches and 1 episode of green, non-bloody vomit this morning in the ED. Also has been experiencing increased thirst since onset of pain. Denies any change in urine output or color, no visible blood, and denies pain or burning with urination. She has no prior history of kidney stones. On presentation in the ER, CBC, UA and UCx, EKG, CXR, CT of abdomen and pelvis were obtained and patient received IVF and IV vancomycin and zosyn.     PMHx - Depression, Systolic heart murmur  Fam Hx - CVD in mother, sister  Soc Hx - Denied tobacco use, admits past EtOH (amount, duration not clarified) but no current use  Surgical Hx - Tonsillectomy in childhood  Allergies - NKDA  Immunizations - Not up to date on pneumococcal, flu, shingles or tetanus vaccines  Screenings - No recent mammogram, colon cancer screening, pap smear or HPV test  HM - PCP: Lesa    ROS: As above, otherwise  Resp - no chest pain, SOB, or cough  GI - no abdominal pain, nausea or diarrhea; no change in bowel movements   - no vaginal discharge, pain or itching      Objective  Vitals  Temp:  [97.5 °F (36.4 °C)-98.2 °F (36.8 °C)] 98 °F (36.7 °C)  Pulse:  [] 72  Resp:  [16-23] 20  SpO2:  [89 %-100 %] 100 %  BP: ()/(43-88) 160/84    Physical  HENT - EOM intact; missing multiple upper and lower teeth; oropharynx and buccal mucosa non-edematous, non-erythematous and no exudates.   CV - regular rate and rhythm. Grade 2/6 systolic murmur. No rubs or gallops. Peripheral pulses 2+  radially. Cap refill <3 seconds  Resp - no wheezing, accessory muscle use or increased WOB. Fine bibasilar crackles noted.  MSK - No CVA tenderness, normal spine ROM   Abd - soft, nondistended. BS+ and normal. No masses or organomegaly, no TTP  Extremities - no edema or erythema. No TTP  Neuro/psych - alert awake and oriented. Normal affect.   Skin - warm and dry. Appears heavily sun exposed    Meds  Scheduled Meds:   piperacillin-tazobactam (ZOSYN) IVPB  4.5 g Intravenous Q8H    QUEtiapine  100 mg Oral Daily    venlafaxine  150 mg Oral BID     Continuous Infusions:   sodium chloride 0.9% 125 mL/hr at 09/26/18 1423     PRN Meds:.[START ON 9/27/2018] influenza, morphine, omnipaque 300 iohexol, ondansetron, [START ON 9/27/2018] pneumoc 13-dontae conj-dip cr(PF), sodium chloride 0.9%, [START ON 9/27/2018] DIPH,PERTUS (ADACEL),TETANUS PF VAC (ADULT), traMADol    Labs  UA    Specimen UA            Urine...     Specimen UA      Color, UA            Yellow     Color, UA     pH, UA            6.0     pH, UA     Specific Tatums, UA            1.010     Specific Tatums, UA     Appearance, UA            Hazy     Appearance, UA     Protein, UA            2+     Protein, UA     Glucose, UA            Negat...     Glucose, UA     Ketones, UA            Negat...     Ketones, UA     Occult Blood UA            3+     Occult Blood UA     Nitrite, UA            Negat...     Nitrite, UA     Urobilinogen, UA            1.0     Urobilinogen, UA     Bilirubin (UA)            Negat...     Bilirubin (UA)     Leukocytes, UA            3+     Leukocytes, UA     RBC, UA            30     RBC, UA     WBC, UA            20     WBC, UA     Bacteria, UA            Many     Bacteria, UA     Hyaline Casts, UA            0     Hyaline Casts, UA     Microscopic Comment            SEE C...     Microscopic       CBC  WBC            1.60     WBC      RBC            4.25     RBC     Hemoglobin            12.7     Hemoglobin     Hematocrit             37.5     Hematocrit     MCV            88     MCV     MCH            29.9     MCH     MCHC            33.9     MCHC     RDW            15.0     RDW     Platelets            153     Platelets     MPV            10.9     MPV     Gran%            42.0     Gran%     Lymph%            14.0     Lymph%     Mono%            10.0     Mono%     Eosinophil%            1.0     Eosinophil%     Basophil%            0.0     Basophil%     BANDS            27.0     BANDS     Metamyelocytes            4.0     Metamyelocytes     Myelocytes            2.0     Myelocytes     Aniso                     CMP  Sodium            137     Sodium      Potassium            3.6     Potassium     Chloride            104     Chloride     CO2            20     CO2     Anion Gap            13     Anion Gap     BUN, Bld            17     BUN, Bld     Creatinine            1.04     Creatinine     eGFR if non             53.4     eGFR if non      eGFR if             >60.0     eGFR if African American     Glucose            116     Glucose     Calcium            8.8     Calcium     Alkaline Phosphatase            289     Alkaline Phosphatase     Total Protein            6.5     Total Protein     Albumin            3.5     Albumin     Total Bilirubin            1.0     Total Bilirubin     AST            41     AST     ALT                     Troponin I- <0.012  Lactate,venous - 4.1, 743AM --> 1.2, 1119AM   Influenza A, B Ags - Negative    ECG Results          EKG 12-lead (Final result)  Result time 09/26/18 08:35:14    Final result by Interface, Lab In [facility] (09/26/18 08:35:14)                 Narrative:    Test Reason : I95.9,  Blood Pressure : 119/069 mmHG  Vent. Rate : 091 BPM     Atrial Rate : 091 BPM     P-R Int : 154 ms          QRS Dur : 098 ms      QT Int : 376 ms       P-R-T Axes : 058 000 039 degrees     QTc Int : 462 ms    Normal sinus rhythm  Normal ECG  When compared with ECG of 17-FEB-2016  16:06,  Left anterior fascicular block is no longer Present  Criteria for Septal infarct are no longer Present  T wave inversion no longer evident in Inferior leads  Confirmed by Orlando ALLISON, Ramos ([id number]) on 9/26/2018 8:35:07 AM    Referred By: CHANTAL LAMBERT           Confirmed By:Ramos Perry MD                            Imaging  CT Renal Stone/Abd/Pelvis -   1.  Moderate hydronephrosis involves the right kidney with evidence of a nonobstructing stone in the lower pole.  There is a 6 mm calcification inferior 0 medial to the right kidney.  Although poorly defined, I suspect this is secondary to a stone at the ureteropelvic junction.  Follow-up studies recommended.    2.  Moderately large hiatal hernia.    3.  Otherwise as above.  No bowel obstruction or free air.  Follow-up recommended.    CXR-  Heart size is borderline with moderate tortuosity of the thoracic aorta.  Patient is rotated but the lung fields are grossly clear.      Impression       Borderline heart size.  Clear lungs.       Assessment  73 yo F who presents with a 6 day history of R-sided flank pain and associated chills, bilous non-bloody vomiting x1 after presentation in the ED. She is being admitted for treatment of sepsis and potential UTI. Patient is currently stable, afebrile with vitals WNL, and not in pain after receiving IV fluids, IV Vancomycin and Zosyn. NPO for possible ureteral stent placement, urology has been consulted.     Problem List  -Sepsis secondary to UTI  -Hydronephrosis R kidney and nephrolithiasis at UPJ - 5mm stone  -Abnormal UA - Hematuria, bacteruria and presence of WBCs  -YESSICA - GFR of 53.4, BUN/Cr = 16.3  -Lactic Acidosis  -Aortic stenosis  -Leukopenia  -Depression  -Immunizations not up to date    Plan  Sepsis secondary to UTI   -Received Vancomycin and Zosyn in ER - deescalate to Zosyn only; optimize after urine culture/sensitivity returns  -f/u urine and blood cultures  -NS IVF @ 125mL/hr  -monitor vitals      Nausea  -zofran 4mg IV PRN    Pain  -provide pain control with tramadol PO q6PRN, morphine IV q6 PRN    Hydronephrosis and nephrolithiasis  -consulted Urology - they will place ureteral stent in R ureter to allow passage of stone  -patient is NPO for procedure    Health Maintenance  -Vaccinations - patient was counseled to receive vaccinations for S. Pneumonia, influenza, TdaP booster as she is not UTD. Initially consented but later changed her mind and does not want

## 2018-09-26 NOTE — PROGRESS NOTES
Results for CODEY NOVOA (MRN [id number])    Ref. Range 9/26/2018 08:44   POC PH Latest Ref Range: 7.35 - 7.45  7.439   POC PCO2 Latest Ref Range: 35 - 45 mmHg 30.4 (L)   POC PO2 Latest Ref Range: 40 - 60 mmHg 34 (LL)   POC BE Latest Ref Range: -2 to 2 mmol/L -4   POC HCO3 Latest Ref Range: 24 - 28 mmol/L 20.6 (L)   POC SATURATED O2 Latest Ref Range: 95 - 100 % 70 (L)   POC TCO2 Latest Ref Range: 24 - 29 mmol/L 22 (L)   Sample Unknown VENOUS   DelSys Unknown Room Air   Allens Test Unknown N/A   Site Unknown Other     Results reported to Dr. Fierro @ [id number]

## 2018-09-26 NOTE — INTERVAL H&P NOTE
The patient has been examined and the H&P has been reviewed:    I concur with the findings and no changes have occurred since H&P was written.    Anesthesia/Surgery risks, benefits and alternative options discussed and understood by patient/family.          Active Hospital Problems    Diagnosis  POA    *Severe sepsis with acute organ dysfunction [A41.9, R65.20]  Yes    Hydronephrosis, right [N13.30]  Yes    Acute cystitis with hematuria [N30.01]  Yes    Obstruction of right ureteropelvic junction (UPJ) due to stone [N20.1]  Yes    Depression [F32.9]  Yes    YESSICA (acute kidney injury) [N17.9]  Yes    Sepsis associated hypotension [A41.9]  Yes    Severe sepsis [A41.9, R65.20]  Yes      Resolved Hospital Problems   No resolved problems to display.

## 2018-09-26 NOTE — H&P
"[facility] Medicine H&P Note     Admitting Team: [facility] Hospitalist Team A  Attending Physician: Annette Swain MD  Resident: Sonny  Intern: Beatriz    Date of Admit: 9/26/2018    Chief Complaint     Right sided flank pain X 5 days    Subjective:      History of Present Illness:    Patient is a 72 year old female with depression and heart murmur who presented to [facility] ER on morning of 9/26 with complaint of right sided flank pain for 5 days. Patient reports symptoms started on Friday, they waxed and waned over weekend and patient believed she was getting better. However 2 days PTA she began having subjective fever and chills. She states that last night was just a real bad night as far as the pain and chills were going, so she decided to show up to ER in morning. Patient denies any urinary symptoms during this time, including frequency, burning, hematuria, or urgency. Patient does admit to polydipsia during this time. Patient also admits to a sore throat yesterday. Patient denies diarrhea, states last BM 2 days ago was looser than normal. Patient denies any sick contacts.    In ER at [facility] patient was very drowsy and stating "could you just give me a pain pill." Patient initial vitals were 98.2 F, , Resp 20, BP of 59/43. Patient given boluses of total of 2.6 L NS. Was also given dose of Vancomycin and Zosyn. For pain she was given ketorolac. Patient sent to [facility].      Past Medical History:  Past Medical History:   Diagnosis Date    Depression    -Heart murmur    Past Surgical History:  Past Surgical History:   Procedure Laterality Date    TONSILLECTOMY         Allergies:  Review of patient's allergies indicates:  No Known Allergies    Home Medications:  Prior to Admission medications    Medication Sig Start Date End Date Taking? Authorizing Provider   aspirin 325 MG tablet Take 325 mg by mouth once daily.   Yes Historical Provider, MD   quetiapine (SEROQUEL) 100 MG Tab Take by " "mouth every evening.    Historical Provider, MD   venlafaxine (EFFEXOR) 50 MG Tab Take 150 mg by mouth 2 (two) times daily.    Historical Provider, MD       Family History:  Mother-Heart disease at 80  Sister-Heart disease at 60    Social History:  Social History     Tobacco Use    Smoking status: Never Smoker    Smokeless tobacco: Never Used   Substance Use Topics    Alcohol use: No     Frequency: Never     Comment: stopped drinking 3 years ago    Drug use: No       Review of Systems:  All other systems are reviewed and are negative.    Health Maintaince :   Primary Care Physician: [facility]    Immunizations:   TDap indicated now  Flu indicated now  Pna indicated now    Cancer Screening:  PAP: Needs  MMG: Not up to date  Colonoscopy: Not up to date     Objective:   Last 24 Hour Vital Signs:  BP  Min: 59/43  Max: 160/84  Temp  Av.9 °F (36.6 °C)  Min: 97.5 °F (36.4 °C)  Max: 98.2 °F (36.8 °C)  Pulse  Av.4  Min: 71  Max: 124  Resp  Av  Min: 16  Max: 36  SpO2  Av.9 %  Min: 89 %  Max: 100 %  Height  Av' 7" (170.2 cm)  Min: 5' 7" (170.2 cm)  Max: 5' 7" (170.2 cm)  Weight  Av.7 kg (127 lb 2.1 oz)  Min: 54.4 kg (120 lb)  Max: 60.9 kg (134 lb 4.2 oz)  Body mass index is 21.03 kg/m².  No intake/output data recorded.    Physical Examination:  Physical Exam   Constitutional: She is oriented to person, place, and time.   Ill appearing, in mild distress. Poor hygiene   HENT:   Head: Normocephalic and atraumatic.   Nose: Nose normal.   Mouth/Throat: No oropharyngeal exudate.   Tachy MM. Poor dentition   Eyes: Conjunctivae are normal. Pupils are equal, round, and reactive to light. No scleral icterus.   Neck: Normal range of motion. Neck supple. No tracheal deviation present.   Cardiovascular: Normal rate, regular rhythm and intact distal pulses.   +2/6 midsystolic murmur appreciated in 2nd intercostal space of RSB and LSB   Pulmonary/Chest: Effort normal. No respiratory distress. She has no wheezes. "   Bilateral lower lobe crackles   Abdominal: Soft. She exhibits no distension. There is no tenderness. There is no rebound and no guarding.   No CVA tenderness   Musculoskeletal: Normal range of motion. She exhibits no edema or tenderness.   Lymphadenopathy:     She has no cervical adenopathy.   Neurological: She is alert and oriented to person, place, and time. Coordination normal.   Skin: Skin is warm and dry. No rash noted. She is not diaphoretic. No erythema.       Laboratory:  Most Recent Data:  CBC:   Lab Results   Component Value Date    WBC 1.60 (LL) 09/26/2018    HGB 12.7 09/26/2018    HCT 37.5 09/26/2018     09/26/2018    MCV 88 09/26/2018    RDW 15.0 (H) 09/26/2018     WBC Differential: 42 % N, 27 % Bands, 14 % L, 10 % M, 1 % Eo, 0 % Baso  BMP:   Lab Results   Component Value Date     09/26/2018    K 3.6 09/26/2018     09/26/2018    CO2 20 (L) 09/26/2018    BUN 17 09/26/2018    CREATININE 1.04 09/26/2018     (H) 09/26/2018    CALCIUM 8.8 09/26/2018     LFTs:   Lab Results   Component Value Date    PROT 6.5 09/26/2018    ALBUMIN 3.5 09/26/2018    BILITOT 1.0 09/26/2018    AST 41 09/26/2018    ALKPHOS 289 (H) 09/26/2018    ALT 33 09/26/2018     Coags:   Lab Results   Component Value Date    INR 0.9 02/17/2016     FLP: No results found for: CHOL, HDL, LDLCALC, TRIG, CHOLHDL  DM:   Lab Results   Component Value Date    CREATININE 1.04 09/26/2018     Thyroid: No results found for: TSH, FREET4, T6DRHNB, Z7HZOZF, THYROIDAB  Anemia: No results found for: IRON, TIBC, FERRITIN, JYFKXNXQ49, FOLATE  Cardiac:   Lab Results   Component Value Date    TROPONINI <0.012 09/26/2018     02/17/2016     Urinalysis:   Lab Results   Component Value Date    COLORU Yellow 09/26/2018    SPECGRAV 1.010 09/26/2018    NITRITE Negative 09/26/2018    KETONESU Negative 09/26/2018    UROBILINOGEN 1.0 09/26/2018    WBCUA 20 (H) 09/26/2018       Trended Lab Data:  Recent Labs   Lab  09/26/18   0743   WBC   1.60*   HGB  12.7   HCT  37.5   PLT  153   MCV  88   RDW  15.0*   NA  137   K  3.6   CL  104   CO2  20*   BUN  17   CREATININE  1.04   GLU  116*   PROT  6.5   ALBUMIN  3.5   BILITOT  1.0   AST  41   ALKPHOS  289*   ALT  33       Trended Cardiac Data:  Recent Labs   Lab  09/26/18   0743   TROPONINI  <0.012       Microbiology Data:  Blood cx 9/26: pending  Urine Cx 9/26: pending    Other Results:  EKG (my interpretation):  NSR    Radiology:  Imaging Results          CT Renal Stone Study ABD Pelvis WO (Final result)  Result time 09/26/18 08:36:07    Final result by Carlos Liriano III, MD (09/26/18 08:36:07)                 Impression:      1.  Moderate hydronephrosis involves the right kidney with evidence of a nonobstructing stone in the lower pole.  There is a 6 mm calcification inferior 0 medial to the right kidney.  Although poorly defined, I suspect this is secondary to a stone at the ureteropelvic junction.  Follow-up studies recommended.    2.  Moderately large hiatal hernia.    3.  Otherwise as above.  No bowel obstruction or free air.  Follow-up recommended.    All CT scans at this facility are performed  using dose modulation techniques as appropriate to performed exam including the following:  automated exposure control; adjustment of mA and/or kV according to the patients size (this includes techniques or standardized protocols for targeted exams where dose is matched to indication/reason for exam: i.e. extremities or head);  iterative reconstruction technique.      Electronically signed by: Carlos Liriano MD  Date:    09/26/2018  Time:    08:36             Narrative:    EXAMINATION:  CT RENAL STONE STUDY ABD PELVIS WO    CLINICAL HISTORY:  right flank pain;    TECHNIQUE:  Axial CT images performed through the abdomen and pelvis without intravenous contrast. Multiplanar reformats were performed and interpreted.    COMPARISON:  None    FINDINGS:  The heart size is borderline.  There is basilar  scarring and/or atelectatic change.  There is no free intraperitoneal air.  No kwame bowel obstruction.  Bony windows show no acute abnormality.  There is a small pericardial effusion.  Moderately large hiatal hernia noted.  The liver and spleen are unremarkable.  There is no definite adrenal mass or gross enlargement.  Pancreas is poorly defined but there is no discrete mass.  Left kidney shows no obstruction.  Right kidney is enlarged with evidence of moderate hydronephrosis.  There is a 5 mm stone in the mid to lower pole of the right kidney the right renal pelvis is distended although the detail about the pelvis and proximal ureter is somewhat limited, there is a calcification in this region measuring 6 mm in diameter which is suspect represents stone at the ureteropelvic junction.  Borderline bladder wall thickening.    In the right lower quadrant there is no definite inflammatory mass to suggest acute appendicitis.  Moderate volume of stool in the colon.                               X-Ray Chest AP Portable (Final result)  Result time 09/26/18 08:02:51    Final result by Carlos Liriano III, MD (09/26/18 08:02:51)                 Impression:      Borderline heart size.  Clear lungs.      Electronically signed by: Carlos Liriano MD  Date:    09/26/2018  Time:    08:02             Narrative:    EXAMINATION:  XR CHEST AP PORTABLE    CLINICAL HISTORY:  Sepsis;    COMPARISON:  Two thousand sixteen    FINDINGS:  Heart size is borderline with moderate tortuosity of the thoracic aorta.  Patient is rotated but the lung fields are grossly clear.                                 Assessment:     Viv Srivastava is a 72 y.o. female with:  Patient Active Problem List    Diagnosis Date Noted    Severe sepsis with acute organ dysfunction 09/26/2018    Hydronephrosis, right 09/26/2018    Acute cystitis with hematuria 09/26/2018    Obstruction of right ureteropelvic junction (UPJ) due to stone 09/26/2018     Depression 09/26/2018    YESSICA (acute kidney injury) 09/26/2018    Sepsis associated hypotension 09/26/2018    Severe sepsis 09/26/2018    Aortic stenosis 09/26/2018    Closed fracture of proximal end of left humerus 04/04/2016        Plan:     Severe Sepsis 2/2 to Urinary Tract Infection  -Patient with right flank pain X5 days, denies any urinary symptoms however UA showing 3+ leukocytes and 2+ protein and many bacteria  -qSOFA: 2/3  -Patient given boluses of total of 2.6 L NS in ER along with dose of Vancomycin and Zosyn.  -Patient initial WBC 1.6, RR 22, , BP of 59/43  -Initial lactate 4.1 ---> 1.2  -Bcx and Ucx pending  - Will continue Zosyn during hospital stay    Nephrolithiasis with hydronephrosis  -Patient with right flank pain X5 days  -CT Renal shows moderate right hydronephrosis with 6 mm calcification at ureteropelvic junction  -Urology Dr. Gill consulted and plan to place stent in on 9/26  -Will start on pain and nausea regimen    YESSICA  -Baseline Cr 0.60 with GFR > 60 on February 2016  -Creatinine 1.04, GFR 53.4 on 9/26/18  -Prerenal vs postrenal, will check urine Na and Cr  -On continuous NS at 125 ml/hr    Mild Aortic Stenosis  -RSB murmur on exam, patient asymptomatic  -ECHO with SKY of 1.53 cm2 in July 2017    Depression  -Continue home venlafaxine 150 mg BID and Seroquel 100 mg daily    Fluids: NS at 125 ml/hr  Electrolytes: monitor  Nutrition: NPO for procedure  DVT PPx: Heparin after procedure    Dispo: IV antibiotics, ureteral stent    Code Status:     Full    Guillaume Henderson, DO  [facility] HO-1    [facility] Medicine Hospitalist Pager numbers:   [facility] Hospitalist Medicine Team A (Robinson/Brynn): [phone]  [facility] Hospitalist Medicine Team B (Nicole/Eileen):  [phone]

## 2018-09-26 NOTE — HPI
72 y.o. female with past medical history below with her first episode of kidney stones. She states she has never had kidney stones to her knowledge prior. She was experiencing worsening right flank pain and presented to [facility] ER. She also notes subjective chills.     Past Medical History:   Diagnosis Date    Depression      Past Surgical History:   Procedure Laterality Date    TONSILLECTOMY       History reviewed. No pertinent family history.  Social History     Tobacco Use    Smoking status: Never Smoker    Smokeless tobacco: Never Used   Substance Use Topics    Alcohol use: No     Frequency: Never     Comment: stopped drinking 3 years ago    Drug use: No     No current facility-administered medications on file prior to encounter.      Current Outpatient Medications on File Prior to Encounter   Medication Sig Dispense Refill    aspirin 325 MG tablet Take 325 mg by mouth once daily.      quetiapine (SEROQUEL) 100 MG Tab Take by mouth every evening.      venlafaxine (EFFEXOR) 50 MG Tab Take 150 mg by mouth 2 (two) times daily.       Review of patient's allergies indicates:  No Known Allergies

## 2018-09-26 NOTE — SUBJECTIVE & OBJECTIVE
Past Medical History:   Diagnosis Date    Depression        Past Surgical History:   Procedure Laterality Date    TONSILLECTOMY         Review of patient's allergies indicates:  No Known Allergies    Family History     None          Tobacco Use    Smoking status: Never Smoker    Smokeless tobacco: Never Used   Substance and Sexual Activity    Alcohol use: No     Frequency: Never     Comment: stopped drinking 3 years ago    Drug use: No    Sexual activity: Not on file       Review of Systems   Constitutional: Negative for diaphoresis.   HENT: Negative for facial swelling.    Eyes: Negative for visual disturbance.   Respiratory: Negative for shortness of breath.    Cardiovascular: Negative for chest pain.   Gastrointestinal: Negative for abdominal distention.   Musculoskeletal: Negative for gait problem.   Skin: Negative for color change.   Neurological: Negative for speech difficulty.   Hematological: Does not bruise/bleed easily.   Psychiatric/Behavioral: Negative for agitation and behavioral problems.       Objective:     Temp:  [97.5 °F (36.4 °C)-98.2 °F (36.8 °C)] 98 °F (36.7 °C)  Pulse:  [] 72  Resp:  [16-36] 22  SpO2:  [89 %-100 %] 99 %  BP: ()/(43-97) 159/90     Body mass index is 21.03 kg/m².    Date 09/26/18 0700 - 09/27/18 0659   Shift 0576-1913 4345-3128 7796-2926 24 Hour Total   INTAKE   I.V.(mL/kg)  77.1(1.3)  77.1(1.3)   IV Piggyback 1732 100  1832   Shift Total(mL/kg) 1732(28.4) 177.1(2.9)  1909.1(31.3)   OUTPUT   Urine(mL/kg/hr)  400  400   Shift Total(mL/kg)  400(6.6)  400(6.6)   Weight (kg) 60.9 60.9 60.9 60.9          Drains          None          Physical Exam   Constitutional: She is oriented to person, place, and time. She appears well-developed and well-nourished.   HENT:   Head: Normocephalic and atraumatic.   Eyes: EOM are normal. Pupils are equal, round, and reactive to light.   Neck: Normal range of motion. Neck supple.   Cardiovascular: Intact distal pulses.     Pulmonary/Chest: Effort normal. No respiratory distress.   Abdominal: Soft. She exhibits no distension.   Musculoskeletal: Normal range of motion. She exhibits no edema.   Neurological: She is alert and oriented to person, place, and time.   Skin: Skin is warm and dry.     Psychiatric: She has a normal mood and affect. Her behavior is normal.       Significant Labs:    BMP:  Recent Labs   Lab  09/26/18   0743   NA  137   K  3.6   CL  104   CO2  20*   BUN  17   CREATININE  1.04   CALCIUM  8.8       CBC:  Recent Labs   Lab  09/26/18   0743   WBC  1.60*   HGB  12.7   HCT  37.5   PLT  153       All pertinent labs results from the past 24 hours have been reviewed.  Recent Lab Results       09/26/18  1119 09/26/18  0844 09/26/18  0802 09/26/18  0753 09/26/18  0743      Albumin     3.5     Alkaline Phosphatase     289     Allens Test  N/A        ALT     33     Anion Gap     13     Aniso     Slight     Appearance, UA    Hazy      AST     41     Bacteria, UA    Many      BANDS     27.0     Basophil%     0.0     Bilirubin (UA)    Negative      Total Bilirubin     1.0  Comment:  For infants and newborns, interpretation of results should be based  on gestational age, weight and in agreement with clinical  observations.  Premature Infant recommended reference ranges:  Up to 24 hours.............<8.0 mg/dL  Up to 48 hours............<12.0 mg/dL  3-5 days..................<15.0 mg/dL  6-29 days.................<15.0 mg/dL       Site  Other        BUN, Bld     17     Calcium     8.8     Chloride     104     CO2     20     Color, UA    Yellow      Creatinine     1.04     DelSys  Room Air        Differential Method     Manual     eGFR if      >60.0     eGFR if non      53.4  Comment:  Calculation used to obtain the estimated glomerular filtration  rate (eGFR) is the CKD-EPI equation.        Eosinophil%     1.0     Flu A & B Source   Nasopharyngeal Swab       Glucose     116     Glucose, UA     Negative      Gran%     42.0     Hematocrit     37.5     Hemoglobin     12.7     Hyaline Casts, UA    0      Influenza A Ag, EIA   Negative       Influenza B Ag, EIA   Negative       Ketones, UA    Negative      Lactate, Tal 1.2    4.1  Comment:  Lactic Acid critical result(s) called and verbal readback obtained   from Phu Trejo (R.N.) , 09/26/2018 08:16       Leukocytes, UA    3+      Lymph%     14.0     MCH     29.9     MCHC     33.9     MCV     88     Metamyelocytes     4.0     Microscopic Comment    SEE COMMENT  Comment:  Other formed elements not mentioned in the report are not   present in the microscopic examination.         Mono%     10.0     MPV     10.9     Myelocytes     2.0     Nitrite, UA    Negative      Occult Blood UA    3+      pH, UA    6.0      Platelets     153     POC BE  -4        POC HCO3  20.6        POC PCO2  30.4        POC PH  7.439        POC PO2  34        POC SATURATED O2  70        POC TCO2  22        Potassium     3.6     Total Protein     6.5     Protein, UA    2+  Comment:  Recommend a 24 hour urine protein or a urine   protein/creatinine ratio if globulin induced proteinuria is  clinically suspected.        RBC     4.25     RBC, UA    30      RDW     15.0     Sample  VENOUS        Sodium     137     Specific [place], UA    1.010      Specimen UA    Urine, Catheterized      Troponin I     <0.012     Urobilinogen, UA    1.0      WBC, UA    20      WBC     1.60  Comment:  WBC critical result(s) called and verbal readback obtained from   Niki Trejo., 09/26/2018 08:24                       Significant Imaging:  CT scan reviewed - proximal 6mm ureteral calculus with hydroureteronephrosis down to the level of the calcification.

## 2018-09-26 NOTE — H&P (VIEW-ONLY)
[facility]  Urology  Consult Note    Patient Name: Viv Srivastava  MRN: [id number]  Admission Date: 9/26/2018  Hospital Length of Stay: 0   Code Status: Full Code   Attending Provider: Annette Swain MD   Consulting Provider: Irma Gill MD  Primary Care Physician: Mu Mcfadden MD  Principal Problem:Severe sepsis with acute organ dysfunction    Inpatient consult to Urology  Consult performed by: Irma Gill MD  Consult ordered by: Drew Dennis MD  Reason for consult: ureteral calculus; obstruction  Assessment/Recommendations: 72 y.o. female with right flank pain, 6mm proximal ureteral stone, proximal hydroureteronephrosis, abnormal WBC count, abnormal urinalysis suspicious for a UTI    Plan:  1. I have explained the indication and benefits of proceeding with a cystoscopy, right ureteral stent placement, possible retrograde pyelogram with me in the operating room. Alternatives of the procedure were also discussed which included continued observation, IV fluids and antibiotics. The risks of waiting include worsening infection, sepsis, and end organ damage.      The risks of the procedure included but were not limited to pain, infection (urinary tract infection), bleeding (hematuria), ureteral or urethral stricture,  injury to the urethra, bladder, ureter, pain, and discomfort related to the stent were discussed in depth with the patient.      The ureteral stent was discussed at length. It is meant to be only a temporizing measure until we proceed with definitive stone management. If left indwelling, the sequelae include pain, infection, lower urinary tract symptoms, development of calcifications on the ureteral stent, worsening kidney function, and complete loss of kidney function.      2. Continue to monitor for signs/sx of sepsis postop.  3. followup urine culture.  4. Will place a brush after stent procedure that will likely be able to be removed prior to discharge.            Subjective:     HPI:  72 y.o. female with past medical history below with her first episode of kidney stones. She states she has never had kidney stones to her knowledge prior. She was experiencing worsening right flank pain and presented to [facility] ER. She also notes subjective chills.     Past Medical History:   Diagnosis Date    Depression      Past Surgical History:   Procedure Laterality Date    TONSILLECTOMY       History reviewed. No pertinent family history.  Social History     Tobacco Use    Smoking status: Never Smoker    Smokeless tobacco: Never Used   Substance Use Topics    Alcohol use: No     Frequency: Never     Comment: stopped drinking 3 years ago    Drug use: No     No current facility-administered medications on file prior to encounter.      Current Outpatient Medications on File Prior to Encounter   Medication Sig Dispense Refill    aspirin 325 MG tablet Take 325 mg by mouth once daily.      quetiapine (SEROQUEL) 100 MG Tab Take by mouth every evening.      venlafaxine (EFFEXOR) 50 MG Tab Take 150 mg by mouth 2 (two) times daily.       Review of patient's allergies indicates:  No Known Allergies      Past Medical History:   Diagnosis Date    Depression        Past Surgical History:   Procedure Laterality Date    TONSILLECTOMY         Review of patient's allergies indicates:  No Known Allergies    Family History     None          Tobacco Use    Smoking status: Never Smoker    Smokeless tobacco: Never Used   Substance and Sexual Activity    Alcohol use: No     Frequency: Never     Comment: stopped drinking 3 years ago    Drug use: No    Sexual activity: Not on file       Review of Systems   Constitutional: Negative for diaphoresis.   HENT: Negative for facial swelling.    Eyes: Negative for visual disturbance.   Respiratory: Negative for shortness of breath.    Cardiovascular: Negative for chest pain.   Gastrointestinal: Negative for abdominal distention.    Musculoskeletal: Negative for gait problem.   Skin: Negative for color change.   Neurological: Negative for speech difficulty.   Hematological: Does not bruise/bleed easily.   Psychiatric/Behavioral: Negative for agitation and behavioral problems.       Objective:     Temp:  [97.5 °F (36.4 °C)-98.2 °F (36.8 °C)] 98 °F (36.7 °C)  Pulse:  [] 72  Resp:  [16-36] 22  SpO2:  [89 %-100 %] 99 %  BP: ()/(43-97) 159/90     Body mass index is 21.03 kg/m².    Date 09/26/18 0700 - 09/27/18 0659   Shift 0578-6031 3644-9158 7454-6128 24 Hour Total   INTAKE   I.V.(mL/kg)  77.1(1.3)  77.1(1.3)   IV Piggyback 1732 100  1832   Shift Total(mL/kg) 1732(28.4) 177.1(2.9)  1909.1(31.3)   OUTPUT   Urine(mL/kg/hr)  400  400   Shift Total(mL/kg)  400(6.6)  400(6.6)   Weight (kg) 60.9 60.9 60.9 60.9          Drains          None          Physical Exam   Constitutional: She is oriented to person, place, and time. She appears well-developed and well-nourished.   HENT:   Head: Normocephalic and atraumatic.   Eyes: EOM are normal. Pupils are equal, round, and reactive to light.   Neck: Normal range of motion. Neck supple.   Cardiovascular: Intact distal pulses.    Pulmonary/Chest: Effort normal. No respiratory distress.   Abdominal: Soft. She exhibits no distension.   Musculoskeletal: Normal range of motion. She exhibits no edema.   Neurological: She is alert and oriented to person, place, and time.   Skin: Skin is warm and dry.     Psychiatric: She has a normal mood and affect. Her behavior is normal.       Significant Labs:    BMP:  Recent Labs   Lab  09/26/18   0743   NA  137   K  3.6   CL  104   CO2  20*   BUN  17   CREATININE  1.04   CALCIUM  8.8       CBC:  Recent Labs   Lab  09/26/18   0743   WBC  1.60*   HGB  12.7   HCT  37.5   PLT  153       All pertinent labs results from the past 24 hours have been reviewed.  Recent Lab Results       09/26/18  1119 09/26/18  0844 09/26/18  0802 09/26/18  0753 09/26/18  0743      Albumin      3.5     Alkaline Phosphatase     289     Allens Test  N/A        ALT     33     Anion Gap     13     Aniso     Slight     Appearance, UA    Hazy      AST     41     Bacteria, UA    Many      BANDS     27.0     Basophil%     0.0     Bilirubin (UA)    Negative      Total Bilirubin     1.0  Comment:  For infants and newborns, interpretation of results should be based  on gestational age, weight and in agreement with clinical  observations.  Premature Infant recommended reference ranges:  Up to 24 hours.............<8.0 mg/dL  Up to 48 hours............<12.0 mg/dL  3-5 days..................<15.0 mg/dL  6-29 days.................<15.0 mg/dL       Site  Other        BUN, Bld     17     Calcium     8.8     Chloride     104     CO2     20     Color, UA    Yellow      Creatinine     1.04     DelSys  Room Air        Differential Method     Manual     eGFR if      >60.0     eGFR if non      53.4  Comment:  Calculation used to obtain the estimated glomerular filtration  rate (eGFR) is the CKD-EPI equation.        Eosinophil%     1.0     Flu A & B Source   Nasopharyngeal Swab       Glucose     116     Glucose, UA    Negative      Gran%     42.0     Hematocrit     37.5     Hemoglobin     12.7     Hyaline Casts, UA    0      Influenza A Ag, EIA   Negative       Influenza B Ag, EIA   Negative       Ketones, UA    Negative      Lactate, Tal 1.2    4.1  Comment:  Lactic Acid critical result(s) called and verbal readback obtained   from Phu Trejo ([email or web address]) , 09/26/2018 08:16       Leukocytes, UA    3+      Lymph%     14.0     MCH     29.9     MCHC     33.9     MCV     88     Metamyelocytes     4.0     Microscopic Comment    SEE COMMENT  Comment:  Other formed elements not mentioned in the report are not   present in the microscopic examination.         Mono%     10.0     MPV     10.9     Myelocytes     2.0     Nitrite, UA    Negative      Occult Blood UA    3+      pH, UA    6.0      Platelets      153     POC BE  -4        POC HCO3  20.6        POC PCO2  30.4        POC PH  7.439        POC PO2  34        POC SATURATED O2  70        POC TCO2  22        Potassium     3.6     Total Protein     6.5     Protein, UA    2+  Comment:  Recommend a 24 hour urine protein or a urine   protein/creatinine ratio if globulin induced proteinuria is  clinically suspected.        RBC     4.25     RBC, UA    30      RDW     15.0     Sample  VENOUS        Sodium     137     Specific [place], UA    1.010      Specimen UA    Urine, Catheterized      Troponin I     <0.012     Urobilinogen, UA    1.0      WBC, UA    20      WBC     1.60  Comment:  WBC critical result(s) called and verbal readback obtained from   Niki Trejo., 09/26/2018 08:24                       Significant Imaging:  CT scan reviewed - proximal 6mm ureteral calculus with hydroureteronephrosis down to the level of the calcification.                     Assessment and Plan:     Obstruction of right ureteropelvic junction (UPJ) due to stone    72 y.o. female with right flank pain, 6mm proximal ureteral stone, proximal hydroureteronephrosis, abnormal WBC count, abnormal urinalysis suspicious for a UTI    Plan:  1. I have explained the indication and benefits of proceeding with a cystoscopy, right ureteral stent placement, possible retrograde pyelogram with me in the operating room. Alternatives of the procedure were also discussed which included continued observation, IV fluids and antibiotics. The risks of waiting include worsening infection, sepsis, and end organ damage.      The risks of the procedure included but were not limited to pain, infection (urinary tract infection), bleeding (hematuria), ureteral or urethral stricture,  injury to the urethra, bladder, ureter, pain, and discomfort related to the stent were discussed in depth with the patient.      The ureteral stent was discussed at length. It is meant to be only a temporizing measure until we proceed  with definitive stone management. If left indwelling, the sequelae include pain, infection, lower urinary tract symptoms, development of calcifications on the ureteral stent, worsening kidney function, and complete loss of kidney function.      2. Continue to monitor for signs/sx of sepsis postop.  3. followup urine culture.  4. Will place a brush after stent procedure that will likely be able to be removed prior to discharge.             VTE Risk Mitigation (From admission, onward)        Ordered     IP VTE HIGH RISK PATIENT  Once      09/26/18 1418     Place sequential compression device  Until discontinued      09/26/18 1354          Thank you for your consult.      Irma Gill MD  Urology  [facility]

## 2018-09-26 NOTE — PLAN OF CARE
Problem: Patient Care Overview  Goal: Plan of Care Review  Outcome: Ongoing (interventions implemented as appropriate)  VSS after fluid boluses given at [facility]. Stent and catheter placed per urologist. Tolerated procedure well. Clear, yellow urine. Urologist explained to patient the need for follow up after hospitalization to address kidney stone. Pain controlled with medications. Home medications resumed. Monitoring UOP. Safety maintained. Bedrails up x2, call light within reach, son at bedside. Family updated and educated on pt condition.

## 2018-09-26 NOTE — CONSULTS
[facility]  Urology  Consult Note    Patient Name: Viv Srivastava  MRN: [id number]  Admission Date: 9/26/2018  Hospital Length of Stay: 0   Code Status: Full Code   Attending Provider: Annette Swain MD   Consulting Provider: Irma Gill MD  Primary Care Physician: Mu Mcfadden MD  Principal Problem:Severe sepsis with acute organ dysfunction    Inpatient consult to Urology  Consult performed by: Irma Gill MD  Consult ordered by: Drew Dennis MD  Reason for consult: ureteral calculus; obstruction  Assessment/Recommendations: 72 y.o. female with right flank pain, 6mm proximal ureteral stone, proximal hydroureteronephrosis, abnormal WBC count, abnormal urinalysis suspicious for a UTI    Plan:  1. I have explained the indication and benefits of proceeding with a cystoscopy, right ureteral stent placement, possible retrograde pyelogram with me in the operating room. Alternatives of the procedure were also discussed which included continued observation, IV fluids and antibiotics. The risks of waiting include worsening infection, sepsis, and end organ damage.      The risks of the procedure included but were not limited to pain, infection (urinary tract infection), bleeding (hematuria), ureteral or urethral stricture,  injury to the urethra, bladder, ureter, pain, and discomfort related to the stent were discussed in depth with the patient.      The ureteral stent was discussed at length. It is meant to be only a temporizing measure until we proceed with definitive stone management. If left indwelling, the sequelae include pain, infection, lower urinary tract symptoms, development of calcifications on the ureteral stent, worsening kidney function, and complete loss of kidney function.      2. Continue to monitor for signs/sx of sepsis postop.  3. followup urine culture.  4. Will place a brush after stent procedure that will likely be able to be removed prior to discharge.            Subjective:     HPI:  72 y.o. female with past medical history below with her first episode of kidney stones. She states she has never had kidney stones to her knowledge prior. She was experiencing worsening right flank pain and presented to [facility] ER. She also notes subjective chills.     Past Medical History:   Diagnosis Date    Depression      Past Surgical History:   Procedure Laterality Date    TONSILLECTOMY       History reviewed. No pertinent family history.  Social History     Tobacco Use    Smoking status: Never Smoker    Smokeless tobacco: Never Used   Substance Use Topics    Alcohol use: No     Frequency: Never     Comment: stopped drinking 3 years ago    Drug use: No     No current facility-administered medications on file prior to encounter.      Current Outpatient Medications on File Prior to Encounter   Medication Sig Dispense Refill    aspirin 325 MG tablet Take 325 mg by mouth once daily.      quetiapine (SEROQUEL) 100 MG Tab Take by mouth every evening.      venlafaxine (EFFEXOR) 50 MG Tab Take 150 mg by mouth 2 (two) times daily.       Review of patient's allergies indicates:  No Known Allergies      Past Medical History:   Diagnosis Date    Depression        Past Surgical History:   Procedure Laterality Date    TONSILLECTOMY         Review of patient's allergies indicates:  No Known Allergies    Family History     None          Tobacco Use    Smoking status: Never Smoker    Smokeless tobacco: Never Used   Substance and Sexual Activity    Alcohol use: No     Frequency: Never     Comment: stopped drinking 3 years ago    Drug use: No    Sexual activity: Not on file       Review of Systems   Constitutional: Negative for diaphoresis.   HENT: Negative for facial swelling.    Eyes: Negative for visual disturbance.   Respiratory: Negative for shortness of breath.    Cardiovascular: Negative for chest pain.   Gastrointestinal: Negative for abdominal distention.    Musculoskeletal: Negative for gait problem.   Skin: Negative for color change.   Neurological: Negative for speech difficulty.   Hematological: Does not bruise/bleed easily.   Psychiatric/Behavioral: Negative for agitation and behavioral problems.       Objective:     Temp:  [97.5 °F (36.4 °C)-98.2 °F (36.8 °C)] 98 °F (36.7 °C)  Pulse:  [] 72  Resp:  [16-36] 22  SpO2:  [89 %-100 %] 99 %  BP: ()/(43-97) 159/90     Body mass index is 21.03 kg/m².    Date 09/26/18 0700 - 09/27/18 0659   Shift 6535-9439 3556-5949 6342-8840 24 Hour Total   INTAKE   I.V.(mL/kg)  77.1(1.3)  77.1(1.3)   IV Piggyback 1732 100  1832   Shift Total(mL/kg) 1732(28.4) 177.1(2.9)  1909.1(31.3)   OUTPUT   Urine(mL/kg/hr)  400  400   Shift Total(mL/kg)  400(6.6)  400(6.6)   Weight (kg) 60.9 60.9 60.9 60.9          Drains          None          Physical Exam   Constitutional: She is oriented to person, place, and time. She appears well-developed and well-nourished.   HENT:   Head: Normocephalic and atraumatic.   Eyes: EOM are normal. Pupils are equal, round, and reactive to light.   Neck: Normal range of motion. Neck supple.   Cardiovascular: Intact distal pulses.    Pulmonary/Chest: Effort normal. No respiratory distress.   Abdominal: Soft. She exhibits no distension.   Musculoskeletal: Normal range of motion. She exhibits no edema.   Neurological: She is alert and oriented to person, place, and time.   Skin: Skin is warm and dry.     Psychiatric: She has a normal mood and affect. Her behavior is normal.       Significant Labs:    BMP:  Recent Labs   Lab  09/26/18   0743   NA  137   K  3.6   CL  104   CO2  20*   BUN  17   CREATININE  1.04   CALCIUM  8.8       CBC:  Recent Labs   Lab  09/26/18   0743   WBC  1.60*   HGB  12.7   HCT  37.5   PLT  153       All pertinent labs results from the past 24 hours have been reviewed.  Recent Lab Results       09/26/18  1119 09/26/18  0844 09/26/18  0802 09/26/18  0753 09/26/18  0743      Albumin      3.5     Alkaline Phosphatase     289     Allens Test  N/A        ALT     33     Anion Gap     13     Aniso     Slight     Appearance, UA    Hazy      AST     41     Bacteria, UA    Many      BANDS     27.0     Basophil%     0.0     Bilirubin (UA)    Negative      Total Bilirubin     1.0  Comment:  For infants and newborns, interpretation of results should be based  on gestational age, weight and in agreement with clinical  observations.  Premature Infant recommended reference ranges:  Up to 24 hours.............<8.0 mg/dL  Up to 48 hours............<12.0 mg/dL  3-5 days..................<15.0 mg/dL  6-29 days.................<15.0 mg/dL       Site  Other        BUN, Bld     17     Calcium     8.8     Chloride     104     CO2     20     Color, UA    Yellow      Creatinine     1.04     DelSys  Room Air        Differential Method     Manual     eGFR if      >60.0     eGFR if non      53.4  Comment:  Calculation used to obtain the estimated glomerular filtration  rate (eGFR) is the CKD-EPI equation.        Eosinophil%     1.0     Flu A & B Source   Nasopharyngeal Swab       Glucose     116     Glucose, UA    Negative      Gran%     42.0     Hematocrit     37.5     Hemoglobin     12.7     Hyaline Casts, UA    0      Influenza A Ag, EIA   Negative       Influenza B Ag, EIA   Negative       Ketones, UA    Negative      Lactate, Tal 1.2    4.1  Comment:  Lactic Acid critical result(s) called and verbal readback obtained   from Phu Trejo ([email or web address]) , 09/26/2018 08:16       Leukocytes, UA    3+      Lymph%     14.0     MCH     29.9     MCHC     33.9     MCV     88     Metamyelocytes     4.0     Microscopic Comment    SEE COMMENT  Comment:  Other formed elements not mentioned in the report are not   present in the microscopic examination.         Mono%     10.0     MPV     10.9     Myelocytes     2.0     Nitrite, UA    Negative      Occult Blood UA    3+      pH, UA    6.0      Platelets      153     POC BE  -4        POC HCO3  20.6        POC PCO2  30.4        POC PH  7.439        POC PO2  34        POC SATURATED O2  70        POC TCO2  22        Potassium     3.6     Total Protein     6.5     Protein, UA    2+  Comment:  Recommend a 24 hour urine protein or a urine   protein/creatinine ratio if globulin induced proteinuria is  clinically suspected.        RBC     4.25     RBC, UA    30      RDW     15.0     Sample  VENOUS        Sodium     137     Specific [place], UA    1.010      Specimen UA    Urine, Catheterized      Troponin I     <0.012     Urobilinogen, UA    1.0      WBC, UA    20      WBC     1.60  Comment:  WBC critical result(s) called and verbal readback obtained from   Niki Trejo., 09/26/2018 08:24                       Significant Imaging:  CT scan reviewed - proximal 6mm ureteral calculus with hydroureteronephrosis down to the level of the calcification.                     Assessment and Plan:     Obstruction of right ureteropelvic junction (UPJ) due to stone    72 y.o. female with right flank pain, 6mm proximal ureteral stone, proximal hydroureteronephrosis, abnormal WBC count, abnormal urinalysis suspicious for a UTI    Plan:  1. I have explained the indication and benefits of proceeding with a cystoscopy, right ureteral stent placement, possible retrograde pyelogram with me in the operating room. Alternatives of the procedure were also discussed which included continued observation, IV fluids and antibiotics. The risks of waiting include worsening infection, sepsis, and end organ damage.      The risks of the procedure included but were not limited to pain, infection (urinary tract infection), bleeding (hematuria), ureteral or urethral stricture,  injury to the urethra, bladder, ureter, pain, and discomfort related to the stent were discussed in depth with the patient.      The ureteral stent was discussed at length. It is meant to be only a temporizing measure until we proceed  with definitive stone management. If left indwelling, the sequelae include pain, infection, lower urinary tract symptoms, development of calcifications on the ureteral stent, worsening kidney function, and complete loss of kidney function.      2. Continue to monitor for signs/sx of sepsis postop.  3. followup urine culture.  4. Will place a brush after stent procedure that will likely be able to be removed prior to discharge.             VTE Risk Mitigation (From admission, onward)        Ordered     IP VTE HIGH RISK PATIENT  Once      09/26/18 1418     Place sequential compression device  Until discontinued      09/26/18 1354          Thank you for your consult.      Irma Gill MD  Urology  [facility]

## 2018-09-27 PROBLEM — D64.9 NORMOCYTIC ANEMIA: Status: ACTIVE | Noted: 2018-09-27

## 2018-09-27 PROBLEM — D50.9 IRON DEFICIENCY ANEMIA: Status: ACTIVE | Noted: 2018-09-27

## 2018-09-27 PROBLEM — K44.9 HIATAL HERNIA: Status: ACTIVE | Noted: 2018-09-27

## 2018-09-27 LAB
ALBUMIN SERPL BCP-MCNC: 2.4 G/DL
ALP SERPL-CCNC: 124 U/L
ALT SERPL W/O P-5'-P-CCNC: 23 U/L
ANION GAP SERPL CALC-SCNC: 10 MMOL/L
AST SERPL-CCNC: 14 U/L
BASOPHILS # BLD AUTO: ABNORMAL K/UL
BASOPHILS NFR BLD: 0 %
BILIRUB SERPL-MCNC: 0.5 MG/DL
BUN SERPL-MCNC: 16 MG/DL
CALCIUM SERPL-MCNC: 8.4 MG/DL
CHLORIDE SERPL-SCNC: 108 MMOL/L
CO2 SERPL-SCNC: 23 MMOL/L
CREAT SERPL-MCNC: 1 MG/DL
DIFFERENTIAL METHOD: ABNORMAL
EOSINOPHIL # BLD AUTO: ABNORMAL K/UL
EOSINOPHIL NFR BLD: 0 %
ERYTHROCYTE [DISTWIDTH] IN BLOOD BY AUTOMATED COUNT: 15 %
EST. GFR  (AFRICAN AMERICAN): >60 ML/MIN/1.73 M^2
EST. GFR  (NON AFRICAN AMERICAN): 56 ML/MIN/1.73 M^2
FERRITIN SERPL-MCNC: 155 NG/ML
FOLATE SERPL-MCNC: 7.1 NG/ML
GGT SERPL-CCNC: 64 U/L
GLUCOSE SERPL-MCNC: 116 MG/DL
HCT VFR BLD AUTO: 35 %
HGB BLD-MCNC: 11.7 G/DL
IRON SERPL-MCNC: 12 UG/DL
LYMPHOCYTES # BLD AUTO: ABNORMAL K/UL
LYMPHOCYTES NFR BLD: 11 %
MCH RBC QN AUTO: 29.3 PG
MCHC RBC AUTO-ENTMCNC: 33.4 G/DL
MCV RBC AUTO: 88 FL
MONOCYTES # BLD AUTO: ABNORMAL K/UL
MONOCYTES NFR BLD: 5 %
NEUTROPHILS NFR BLD: 51 %
NEUTS BAND NFR BLD MANUAL: 33 %
NRBC BLD-RTO: ABNORMAL /100 WBC
PLATELET # BLD AUTO: 168 K/UL
PLATELET BLD QL SMEAR: ABNORMAL
PMV BLD AUTO: 9.8 FL
POTASSIUM SERPL-SCNC: 4.5 MMOL/L
PROT SERPL-MCNC: 5.8 G/DL
RBC # BLD AUTO: 4 M/UL
RETICS/RBC NFR AUTO: 0.8 %
SATURATED IRON: 4 %
SODIUM SERPL-SCNC: 141 MMOL/L
TOTAL IRON BINDING CAPACITY: 303 UG/DL
TRANSFERRIN SERPL-MCNC: 205 MG/DL
VIT B12 SERPL-MCNC: >2000 PG/ML
WBC # BLD AUTO: 16.91 K/UL

## 2018-09-27 PROCEDURE — 20000000 HC ICU ROOM

## 2018-09-27 PROCEDURE — 85007 BL SMEAR W/DIFF WBC COUNT: CPT

## 2018-09-27 PROCEDURE — 25000003 PHARM REV CODE 250: Performed by: STUDENT IN AN ORGANIZED HEALTH CARE EDUCATION/TRAINING PROGRAM

## 2018-09-27 PROCEDURE — 63600175 PHARM REV CODE 636 W HCPCS: Performed by: STUDENT IN AN ORGANIZED HEALTH CARE EDUCATION/TRAINING PROGRAM

## 2018-09-27 PROCEDURE — 63600175 PHARM REV CODE 636 W HCPCS: Performed by: FAMILY MEDICINE

## 2018-09-27 PROCEDURE — 83540 ASSAY OF IRON: CPT

## 2018-09-27 PROCEDURE — 85045 AUTOMATED RETICULOCYTE COUNT: CPT

## 2018-09-27 PROCEDURE — 82607 VITAMIN B-12: CPT

## 2018-09-27 PROCEDURE — 80053 COMPREHEN METABOLIC PANEL: CPT

## 2018-09-27 PROCEDURE — 82728 ASSAY OF FERRITIN: CPT

## 2018-09-27 PROCEDURE — 85027 COMPLETE CBC AUTOMATED: CPT

## 2018-09-27 PROCEDURE — 36415 COLL VENOUS BLD VENIPUNCTURE: CPT

## 2018-09-27 PROCEDURE — 82977 ASSAY OF GGT: CPT

## 2018-09-27 PROCEDURE — 25000003 PHARM REV CODE 250: Performed by: FAMILY MEDICINE

## 2018-09-27 PROCEDURE — 87040 BLOOD CULTURE FOR BACTERIA: CPT

## 2018-09-27 PROCEDURE — 25000003 PHARM REV CODE 250: Performed by: INTERNAL MEDICINE

## 2018-09-27 PROCEDURE — 82746 ASSAY OF FOLIC ACID SERUM: CPT

## 2018-09-27 RX ORDER — TAMSULOSIN HYDROCHLORIDE 0.4 MG/1
0.4 CAPSULE ORAL DAILY
Status: DISCONTINUED | OUTPATIENT
Start: 2018-09-27 | End: 2018-09-29 | Stop reason: HOSPADM

## 2018-09-27 RX ORDER — LISINOPRIL 10 MG/1
10 TABLET ORAL ONCE
Status: DISCONTINUED | OUTPATIENT
Start: 2018-09-27 | End: 2018-09-27

## 2018-09-27 RX ORDER — LABETALOL HYDROCHLORIDE 5 MG/ML
10 INJECTION, SOLUTION INTRAVENOUS ONCE
Status: COMPLETED | OUTPATIENT
Start: 2018-09-27 | End: 2018-09-27

## 2018-09-27 RX ORDER — LISINOPRIL 10 MG/1
10 TABLET ORAL DAILY
Status: DISCONTINUED | OUTPATIENT
Start: 2018-09-27 | End: 2018-09-28

## 2018-09-27 RX ORDER — FAMOTIDINE 20 MG/1
20 TABLET, FILM COATED ORAL DAILY
Status: DISCONTINUED | OUTPATIENT
Start: 2018-09-27 | End: 2018-09-29 | Stop reason: HOSPADM

## 2018-09-27 RX ORDER — LORAZEPAM 0.5 MG/1
0.5 TABLET ORAL ONCE
Status: COMPLETED | OUTPATIENT
Start: 2018-09-27 | End: 2018-09-27

## 2018-09-27 RX ORDER — QUETIAPINE FUMARATE 25 MG/1
100 TABLET, FILM COATED ORAL NIGHTLY
Status: DISCONTINUED | OUTPATIENT
Start: 2018-09-27 | End: 2018-09-29 | Stop reason: HOSPADM

## 2018-09-27 RX ORDER — FAMOTIDINE 20 MG/1
20 TABLET, FILM COATED ORAL 2 TIMES DAILY
Status: DISCONTINUED | OUTPATIENT
Start: 2018-09-27 | End: 2018-09-27 | Stop reason: DRUGHIGH

## 2018-09-27 RX ORDER — RAMELTEON 8 MG/1
8 TABLET ORAL NIGHTLY PRN
Status: DISCONTINUED | OUTPATIENT
Start: 2018-09-27 | End: 2018-09-29 | Stop reason: HOSPADM

## 2018-09-27 RX ADMIN — TRAMADOL HYDROCHLORIDE 50 MG: 50 TABLET, COATED ORAL at 01:09

## 2018-09-27 RX ADMIN — SODIUM CHLORIDE: 0.9 INJECTION, SOLUTION INTRAVENOUS at 04:09

## 2018-09-27 RX ADMIN — TAMSULOSIN HYDROCHLORIDE 0.4 MG: 0.4 CAPSULE ORAL at 01:09

## 2018-09-27 RX ADMIN — LORAZEPAM 0.5 MG: 0.5 TABLET ORAL at 03:09

## 2018-09-27 RX ADMIN — VENLAFAXINE 150 MG: 37.5 TABLET ORAL at 08:09

## 2018-09-27 RX ADMIN — PIPERACILLIN AND TAZOBACTAM 4.5 G: 4; .5 INJECTION, POWDER, LYOPHILIZED, FOR SOLUTION INTRAVENOUS; PARENTERAL at 08:09

## 2018-09-27 RX ADMIN — SODIUM CHLORIDE: 0.9 INJECTION, SOLUTION INTRAVENOUS at 11:09

## 2018-09-27 RX ADMIN — LABETALOL HYDROCHLORIDE 10 MG: 5 INJECTION, SOLUTION INTRAVENOUS at 03:09

## 2018-09-27 RX ADMIN — ONDANSETRON 4 MG: 2 INJECTION INTRAMUSCULAR; INTRAVENOUS at 08:09

## 2018-09-27 RX ADMIN — FAMOTIDINE 20 MG: 20 TABLET, FILM COATED ORAL at 08:09

## 2018-09-27 RX ADMIN — QUETIAPINE 100 MG: 25 TABLET ORAL at 08:09

## 2018-09-27 RX ADMIN — LISINOPRIL 10 MG: 10 TABLET ORAL at 10:09

## 2018-09-27 RX ADMIN — PIPERACILLIN AND TAZOBACTAM 4.5 G: 4; .5 INJECTION, POWDER, LYOPHILIZED, FOR SOLUTION INTRAVENOUS; PARENTERAL at 11:09

## 2018-09-27 RX ADMIN — QUETIAPINE FUMARATE 100 MG: 100 TABLET ORAL at 08:09

## 2018-09-27 RX ADMIN — TRAMADOL HYDROCHLORIDE 50 MG: 50 TABLET, COATED ORAL at 10:09

## 2018-09-27 RX ADMIN — PIPERACILLIN AND TAZOBACTAM 4.5 G: 4; .5 INJECTION, POWDER, LYOPHILIZED, FOR SOLUTION INTRAVENOUS; PARENTERAL at 03:09

## 2018-09-27 RX ADMIN — ENOXAPARIN SODIUM 40 MG: 100 INJECTION SUBCUTANEOUS at 05:09

## 2018-09-27 NOTE — PROGRESS NOTES
"[facility] Medicine Progress Note    Primary Team: [facility] Hospitalist Team A  Attending Physician: Annette Swain MD  Resident: Sonny  Intern: Beatriz    Subjective:      Patient feeling much improved this morning. No overnight fevers. Patient states pain is totally improved today, and only use tramadol prn yesterday evening after stent was placed by urology. Patient denying any chest pain, SOB, abdominal pain, swelling.     Objective:     Last 24 Hour Vital Signs:  BP  Min: 59/43  Max: 160/84  Temp  Av.9 °F (36.6 °C)  Min: 97.5 °F (36.4 °C)  Max: 98.4 °F (36.9 °C)  Pulse  Av.9  Min: 71  Max: 124  Resp  Av.6  Min: 11  Max: 36  SpO2  Av.1 %  Min: 89 %  Max: 100 %  Height  Av' 7" (170.2 cm)  Min: 5' 7" (170.2 cm)  Max: 5' 7" (170.2 cm)  Weight  Av kg (130 lb 1.5 oz)  Min: 54.4 kg (120 lb)  Max: 61.7 kg (136 lb 0.4 oz)  I/O last 3 completed shifts:  In: 2534.1 [I.V.:702.1; IV Piggyback:1832]  Out: 1075 [Urine:1075]    Physical Examination:  Physical Exam   Constitutional: She is oriented to person, place, and time.   Ill appearing, in mild distress. Poor hygiene   HENT:   Head: Normocephalic and atraumatic.   Nose: Nose normal.   Mouth/Throat: No oropharyngeal exudate.   MMM. Poor dentition   Eyes: Conjunctivae are normal. Pupils are equal, round, and reactive to light. No scleral icterus.   Neck: Normal range of motion. Neck supple. No tracheal deviation present.   Cardiovascular: Normal rate, regular rhythm and intact distal pulses.   +3/6 midsystolic murmur appreciated in 2nd intercostal space of RSB  Pulmonary/Chest: Effort normal. No respiratory distress. She has no wheezes. CTAB  Abdominal: Soft. She exhibits no distension. There is no tenderness. There is no rebound and no guarding.   No CVA tenderness   Musculoskeletal: Normal range of motion. She exhibits no edema or tenderness.   Lymphadenopathy:     She has no cervical adenopathy.   Neurological: She is alert and oriented to " person, place, and time. Coordination normal.   Skin: Skin is warm and dry. No rash noted. She is not diaphoretic. No erythema.          Laboratory:  Laboratory Data Reviewed: yes  Pertinent Findings:  Lab Results   Component Value Date    WBC 16.91 (H) 09/27/2018    HGB 11.7 (L) 09/27/2018    HCT 35.0 (L) 09/27/2018    MCV 88 09/27/2018     09/27/2018     Lab Results   Component Value Date    CREATININE 1.0 09/27/2018    BUN 16 09/27/2018     09/27/2018    K 4.5 09/27/2018     09/27/2018    CO2 23 09/27/2018     Lab Results   Component Value Date    ALT 23 09/27/2018    AST 14 09/27/2018    GGT 64 (H) 09/27/2018    ALKPHOS 124 09/27/2018    BILITOT 0.5 09/27/2018       Microbiology Data Reviewed: yes  Pertinent Findings:  Blood Cx: NGTD  Urine Cx: pending    Other Results:  EKG (my interpretation):  NSR    Radiology Data Reviewed: yes  Pertinent Findings:  CT Renal: 6 mm stone in UP junction    Current Medications:     Infusions:   sodium chloride 0.9% 125 mL/hr at 09/27/18 0439        Scheduled:   enoxaparin  40 mg Subcutaneous Daily    piperacillin-tazobactam (ZOSYN) IVPB  4.5 g Intravenous Q8H    QUEtiapine  100 mg Oral Daily    venlafaxine  150 mg Oral BID        PRN:  diphenhydrAMINE, HYDROmorphone, morphine, ondansetron, ondansetron, sodium chloride 0.9%, sodium chloride 0.9%, traMADol    Antibiotics and Day Number of Therapy:  Zosyn 9/26-present    Lines and Day Number of Therapy:  PIV 9/26-present    Assessment:     Viv Srivastava is a 72 y.o.female with  Patient Active Problem List    Diagnosis Date Noted    Normocytic anemia 09/27/2018    Severe sepsis with acute organ dysfunction 09/26/2018    Hydronephrosis, right 09/26/2018    Acute cystitis with hematuria 09/26/2018    Obstruction of right ureteropelvic junction (UPJ) due to stone 09/26/2018    Depression 09/26/2018    YESSICA (acute kidney injury) 09/26/2018    Sepsis associated hypotension 09/26/2018    Severe sepsis  09/26/2018    Aortic stenosis 09/26/2018    Closed fracture of proximal end of left humerus 04/04/2016        Plan:     Severe Sepsis 2/2 to Urinary Tract Infection  -Patient with right flank pain X5 days, denies any urinary symptoms however UA showing 3+ leukocytes and 2+ protein and many bacteria  -qSOFA: 2/3  -Patient given boluses of total of 2.6 L NS in ER along with dose of Vancomycin and Zosyn.  -Patient initial WBC 1.6, RR 22, , BP of 59/43  -Initial lactate 4.1 ---> 1.2  -Bcx NGTD  Ucx pending  - Will continue Zosyn during hospital stay and wait for sensitivities     Nephrolithiasis with hydronephrosis  -Patient with right flank pain X5 days  -CT Renal shows moderate right hydronephrosis with 6 mm calcification at ureteropelvic junction  -Urology placed stent on 9/27  -For pain giving tramadol 50 mg q6h prn and morphine 2 mg IV q6h prn  -Zofran for nausea     YESSICA  -Baseline Cr 0.60 with GFR > 60 on February 2016  -Creatinine 1.04, GFR 53.4 on 9/26/18  -Prerenal vs postrenal  FeNa 1.8  -On continuous NS at 125 ml/hr     Normocytic Anemia  -On 9/27 Hb 11/Hct 35/ MCV 88  -Likely dilutional secondary to IV fluids  -Will check iron panel, ferritin, B12, and folate    Mild Aortic Stenosis  -RSB murmur on exam, patient asymptomatic  -ECHO with SKY of 1.53 cm2 in July 2017    Hiatal Hernia  -Incidental finding on imaging of large hiatal hernia  -Patient at home does not have symptoms of indigestion  -Complaining of indegestion at this time, will give to famotidine while here in hospital, consider follow up with PCP if long term symptoms occur     Depression  -Continue home venlafaxine 150 mg BID and Seroquel 100 mg daily     Fluids: NS at 125 ml/hr  Electrolytes: monitor  Nutrition: Full liquid diet, regular diet for lunch  DVT PPx: Heparin     Dispo: IV antibiotics     Code Status:      Full      Guillaume Henderson DO  [facility] HO-1    [facility] Medicine Hospitalist Pager numbers:   [facility] Hospitalist  Medicine Team A (Robinson/Brynn): [phone]  [facility] Hospitalist Medicine Team B (Nicole/Eileen):  [phone]

## 2018-09-27 NOTE — PLAN OF CARE
" met with patient in ICU. Attempted to complete discharge assessment. Patient stated " she just wants to get better and will not answer any questions". Case Management will make second attempt.  "

## 2018-09-27 NOTE — PROGRESS NOTES
[facility]  Urology Progress Note    Problems:   Patient Active Problem List   Diagnosis    Closed fracture of proximal end of left humerus    Severe sepsis with acute organ dysfunction    Hydronephrosis, right    Acute cystitis with hematuria    Obstruction of right ureteropelvic junction (UPJ) due to stone    Depression    YESSICA (acute kidney injury)    Sepsis associated hypotension    Severe sepsis    Aortic stenosis    Iron deficiency anemia    Hiatal hernia       Subjective   NAEO. Afebrile. Tolerating po. Patient reports a significant improvement in her pain. She notes that she feeling overall much better today.    Meds:   enoxaparin  40 mg Subcutaneous Daily    famotidine  20 mg Oral Daily    piperacillin-tazobactam (ZOSYN) IVPB  4.5 g Intravenous Q8H    QUEtiapine  100 mg Oral Daily    tamsulosin  0.4 mg Oral Daily    venlafaxine  150 mg Oral BID      sodium chloride 0.9% 125 mL/hr at 09/27/18 1152     diphenhydrAMINE, HYDROmorphone, morphine, ondansetron, ondansetron, sodium chloride 0.9%, sodium chloride 0.9%, traMADol    Temp:  [97.6 °F (36.4 °C)-98.4 °F (36.9 °C)] 97.6 °F (36.4 °C)  Pulse:  [71-87] 84  Resp:  [11-36] 16  SpO2:  [92 %-100 %] 95 %  BP: (106-160)/(68-97) 154/97    I/O last 3 completed shifts:  In: 4134.1 [I.V.:2202.1; IV Piggyback:1932]  Out: 2085 [Urine:2085]    General:  Alert, cooperative, no distress, appears stated age   Head:  Normocephalic, without obvious abnormality, atraumatic   Eyes:  PERRL, conjunctiva/corneas clear   Lungs:   Respirations unlabored    Heart:  Warm and well perfused   Abdomen:   abdomen is soft without significant tenderness, masses, organomegaly or guarding   : Urethral brush draining light yellow urine   Drains: Urinary Catheter (Brush)   Extremities: Extremities normal, atraumatic, no cyanosis or edema   Skin: Skin color, texture, turgor normal, no rashes or lesions   Psych: Appropriate   Neurologic: Non-focal      Recent Results (from the past 24 hour(s))   Sodium, urine, random    Collection Time: 09/26/18  4:27 PM   Result Value Ref Range    Sodium Urine Random 63 20 - 250 mmol/L   Creatinine, urine, random    Collection Time: 09/26/18  4:27 PM   Result Value Ref Range    Creatinine, Random Ur 26.7 15.0 - 325.0 mg/dL   Iron and TIBC    Collection Time: 09/27/18  3:42 AM   Result Value Ref Range    Iron 12 (L) 30 - 160 ug/dL    Transferrin 205 200 - 375 mg/dL    TIBC 303 250 - 450 ug/dL    Saturated Iron 4 (L) 20 - 50 %   Ferritin    Collection Time: 09/27/18  3:42 AM   Result Value Ref Range    Ferritin 155 20.0 - 300.0 ng/mL   CBC auto differential    Collection Time: 09/27/18  3:44 AM   Result Value Ref Range    WBC 16.91 (H) 3.90 - 12.70 K/uL    RBC 4.00 4.00 - 5.40 M/uL    Hemoglobin 11.7 (L) 12.0 - 16.0 g/dL    Hematocrit 35.0 (L) 37.0 - 48.5 %    MCV 88 82 - 98 fL    MCH 29.3 27.0 - 31.0 pg    MCHC 33.4 32.0 - 36.0 g/dL    RDW 15.0 (H) 11.5 - 14.5 %    Platelets 168 150 - 350 K/uL    MPV 9.8 9.2 - 12.9 fL    Lymph # CANCELED 1.0 - 4.8 K/uL    Mono # CANCELED 0.3 - 1.0 K/uL    Eos # CANCELED 0.0 - 0.5 K/uL    Baso # CANCELED 0.00 - 0.20 K/uL    nRBC SEE COMMENT (A) 0 /100 WBC    Gran% 51.0 38.0 - 73.0 %    Lymph% 11.0 (L) 18.0 - 48.0 %    Mono% 5.0 4.0 - 15.0 %    Eosinophil% 0.0 0.0 - 8.0 %    Basophil% 0.0 0.0 - 1.9 %    Bands 33.0 %    Platelet Estimate Appears normal     Differential Method Manual    Comprehensive metabolic panel    Collection Time: 09/27/18  3:44 AM   Result Value Ref Range    Sodium 141 136 - 145 mmol/L    Potassium 4.5 3.5 - 5.1 mmol/L    Chloride 108 95 - 110 mmol/L    CO2 23 23 - 29 mmol/L    Glucose 116 (H) 70 - 110 mg/dL    BUN, Bld 16 8 - 23 mg/dL    Creatinine 1.0 0.5 - 1.4 mg/dL    Calcium 8.4 (L) 8.7 - 10.5 mg/dL    Total Protein 5.8 (L) 6.0 - 8.4 g/dL    Albumin 2.4 (L) 3.5 - 5.2 g/dL    Total Bilirubin 0.5 0.1 - 1.0 mg/dL    Alkaline Phosphatase 124 55 - 135 U/L    AST 14 10 - 40  U/L    ALT 23 10 - 44 U/L    Anion Gap 10 8 - 16 mmol/L    eGFR if African American >60 >60 mL/min/1.73 m^2    eGFR if non African American 56 (A) >60 mL/min/1.73 m^2   Gamma GT    Collection Time: 09/27/18  3:44 AM   Result Value Ref Range    GGT 64 (H) 8 - 55 U/L   Reticulocytes    Collection Time: 09/27/18  3:44 AM   Result Value Ref Range    Retic 0.8 0.5 - 2.5 %   Folate    Collection Time: 09/27/18  6:52 AM   Result Value Ref Range    Folate 7.1 4.0 - 24.0 ng/mL   Vitamin B12    Collection Time: 09/27/18  6:52 AM   Result Value Ref Range    Vitamin B-12 >2000 (H) 210 - 950 pg/mL       Assessment/Recommendations: 72 y.o. female with right flank pain, 6mm proximal ureteral stone, proximal hydroureteronephrosis, abnormal WBC count, abnormal urinalysis suspicious for a UTI s/p cystoscopy, right ureteral stent placement 9/26/18     Plan:  1. Patient has not had any fevers overnight. Patient's pain has significantly improved. Urine draining well - light yellow urine. WBCs went from 1.6 to 16.9. Continue to trend CBC.  2. Ok to discontinue brush  3. Start flomax  4. Urine culture prelim GNR >100k, once sensitivities return can determine outpatient antibiotic therapy.   5. Please have patient see me in clinic next week. I will obtain a repeat sample in the clinic, and schedule patient for definitive stone surgery at that clinic visit.

## 2018-09-27 NOTE — PLAN OF CARE
Pt AAOx4. Clear fluent speech, and follows commands. On room air sats maintained above 95%> throughout shift. Pt turns independantly. Supported by pillows.  ABT therapy in progress, and temperature monitoring. Fluid therapy in progress NS running at 125ml/hr. Pt's pain/discomfort managed by PRN pain meds. Vickers intact draining clear yellow colored urine into bag. UOP monitoring in progress. Bed bath given. Bed locked in lowest position. Bed alarm active and call light within reach. Will continue to monitor.

## 2018-09-27 NOTE — PROGRESS NOTES
Pharmacist Renal Dose Adjustment Note    Viv Srivastava is a 72 y.o. female being treated with the medication Famotidine    Patient Data:    Vital Signs (Most Recent):  Temp: 97.9 °F (36.6 °C) (09/27/18 0715)  Pulse: 82 (09/27/18 0700)  Resp: 18 (09/27/18 0700)  BP: (!) 143/92 (09/27/18 0700)  SpO2: 95 % (09/27/18 0700)   Vital Signs (72h Range):  Temp:  [97.5 °F (36.4 °C)-98.4 °F (36.9 °C)]   Pulse:  []   Resp:  [11-36]   BP: ()/(43-97)   SpO2:  [89 %-100 %]      Recent Labs   Lab  09/26/18   0743  09/27/18   0344   CREATININE  1.04  1.0     Serum creatinine: 1 mg/dL 09/27/18 0344  Estimated creatinine clearance: 48.7 mL/min    Medication:Famotidine dose: 20 mg frequency BID will be changed to medication:Famotidine dose:20 mg frequency:Daily     Pharmacist's Name: Marjorie Millard  Pharmacist's Extension: [phone]

## 2018-09-27 NOTE — MEDICAL/APP STUDENT
[employer] Medicine Team A Progress Note - L3 Medical Student  Patient: Viv Srivastava  MRN: [id number]  : 1945  Admitted 18  LOS: 1    Subjective  ID: Viv Srivastava is a 71 yo F with past medical history of depression and GERD admitted for treatment of sepsis secondary to UTI, with s/p ureteral stent for nephrolithiasis and hydronephrosis of the right kidney.     Interval: Patient received a ureteral stent yesterday afternoon during procedure with Dr. Gill, urology. She reports she is feeling much better this morning with her pain completed alleviated. She was catheterized post-procedure and the brush remains in place. She denies any urinary symptoms this morning, and reports no fever, chills, nausea or vomiting. Urine collection bag appears dark yellow/brown.       Objective  Vitals  Temp:  [97.5 °F (36.4 °C)-98.4 °F (36.9 °C)] 97.9 °F (36.6 °C)  Pulse:  [71-94] 82  Resp:  [11-36] 18  SpO2:  [92 %-100 %] 95 %  BP: (115-160)/(70-97) 143/92    Physical Exam  Constitutional - no apparent distress, poor hygiene  HENT - numerous upper, lower teeth missing  CV - Regular rate and rhythm. Normal S1/2, grade 3/6 systolic murmur noted at R 2nd ICS. No rubs or gallops.  Resp - fine bibasilar crackles. No wheezes, no increased working or breathing or accessory muscle use   Abd - Soft, non-tender non-distended. BS+ and normoactive  Extremities - no extremity edema, erythema or tenderness  Skin - warm and dry, heavily sun exposed  Neuro - awake, alert and oriented. Speech appropriate   Psych - Affect good.    Meds  Scheduled Meds:   enoxaparin  40 mg Subcutaneous Daily    famotidine  20 mg Oral Daily    piperacillin-tazobactam (ZOSYN) IVPB  4.5 g Intravenous Q8H    QUEtiapine  100 mg Oral Daily    venlafaxine  150 mg Oral BID     Continuous Infusions:   sodium chloride 0.9% 125 mL/hr at 18 0439     PRN Meds:.diphenhydrAMINE, HYDROmorphone, morphine, ondansetron, ondansetron, sodium chloride 0.9%, sodium  chloride 0.9%, traMADol    Labs  CBC  WBC         1.60 16.91      RBC         4.25 4.00     Hemoglobin         12.7 11.7     Hematocrit         37.5 35.0     MCV         88 88     MCH         29.9 29.3     MCHC         33.9 33.4     RDW         15.0 15.0     Platelets         153 168     MPV         10.9 9.8     Gran%         42.0 51.0     Lymph%         14.0 11.0     Lymph #          CANCE...     Mono%         10.0 5.0     Mono #          CANCE...     Eosinophil%         1.0 0.0     Eos #          CANCE...     Basophil%         0.0 0.0     Baso #          CANCE...     BANDS         27.0 33.0     Metamyelocytes         4.0      Myelocytes         2.0      Aniso         Slight      Platelet Estimate          Appea...    Ferritin - 155  CMP  Sodium      137 141      Potassium      3.6 4.5     Chloride      104 108     CO2      20 23     Anion Gap      13 10     BUN, Bld      17 16     Creatinine      1.04 1.0     eGFR if non       53.4 56     eGFR if       >60.0 >60     Glucose      116 116     Calcium      8.8 8.4     Alkaline Phosphatase      289 124     Total Protein      6.5 5.8     Albumin      3.5 2.4     Total Bilirubin      1.0 0.5     AST      41 14     ALT      33 23     GGT       64       Ref Range & Units 09/26/18 1119 09/26/18 0743   Lactate (Lactic Acid) 0.5 - 2.2 mmol/L 1.2  4.1 Critically high  CM   Influenza A,B Ag - Negative  UA  Specimen UA      Urine...       Color, UA      Yellow      pH, UA      6.0      Specific Gravity, UA      1.010      Appearance, UA      Hazy      Protein, UA      2+      Glucose, UA      Negat...      Ketones, UA      Negat...      Occult Blood UA      3+      Nitrite, UA      Negat...      Urobilinogen, UA      1.0      Bilirubin (UA)      Negat...      Leukocytes, UA      3+      RBC, UA      30      WBC, UA      20      Bacteria, UA      Many      Hyaline Casts, UA      0      Microscopic Comment      SEE C...       Micro  Urine Cx -  shows GNR > 100K CFUs, non-lactose fermenting  Blood culture - No growth to date x1d    Imaging  No new imaging    Assessment  Viv Srivastava is a 73 yo F s/p ureteral stent placement, admitted for sepsis secondary to UTI suspected due to renal calculus obstructing the R kidney UPJ with associated hydronephrosis. She is afebrile and in no pain, continues to be treated with IV Zosyn.     Problem List  -Sepsis secondary to UTI  -Hydronephrosis secondary to nephrolithiasis  -YESSICA  -Aortic stenosis  -Depression  -Hiatal hernia  -GERD  -Iron deficiency anemia    Plan  Sepsis,UTI  - Continue IV Zosyn; will plan for transition to PO antibiotics with appropriate coverage when patient is discharged. Current WBC count is 16K+, monitor for resolution.   Renal Calculus - need to f/u with Urology per pts next appt and whether she needs to keep brush in when discharged; stent is not definitive treatment for her stones, pt informed she will likely need stone retrieval or lithotripsy.   YESSICA - resolved with IVF  Depression  - continue home meds, quetiapine and venlafaxine  GERD/Indigestion - Started famotidine 20mg PO qd    Dispo- Patient is much improved, possible discharge tomorrow. Awaiting urology recs for pt follow up and catheter status

## 2018-09-28 PROBLEM — I10 HYPERTENSION: Status: ACTIVE | Noted: 2018-09-28

## 2018-09-28 PROBLEM — R78.81 GRAM-NEGATIVE BACTEREMIA: Status: ACTIVE | Noted: 2018-09-28

## 2018-09-28 PROBLEM — B96.20 E-COLI UTI: Status: ACTIVE | Noted: 2018-09-26

## 2018-09-28 PROBLEM — N39.0 E-COLI UTI: Status: ACTIVE | Noted: 2018-09-26

## 2018-09-28 LAB
ALBUMIN SERPL BCP-MCNC: 2.4 G/DL
ALP SERPL-CCNC: 123 U/L
ALT SERPL W/O P-5'-P-CCNC: 19 U/L
ANION GAP SERPL CALC-SCNC: 10 MMOL/L
AST SERPL-CCNC: 16 U/L
BACTERIA UR CULT: NORMAL
BASOPHILS # BLD AUTO: 0.01 K/UL
BASOPHILS NFR BLD: 0.1 %
BILIRUB SERPL-MCNC: 0.4 MG/DL
BUN SERPL-MCNC: 12 MG/DL
CALCIUM SERPL-MCNC: 8.8 MG/DL
CHLORIDE SERPL-SCNC: 106 MMOL/L
CO2 SERPL-SCNC: 21 MMOL/L
CREAT SERPL-MCNC: 0.7 MG/DL
DIFFERENTIAL METHOD: ABNORMAL
EOSINOPHIL # BLD AUTO: 0.1 K/UL
EOSINOPHIL NFR BLD: 0.3 %
ERYTHROCYTE [DISTWIDTH] IN BLOOD BY AUTOMATED COUNT: 14.7 %
EST. GFR  (AFRICAN AMERICAN): >60 ML/MIN/1.73 M^2
EST. GFR  (NON AFRICAN AMERICAN): >60 ML/MIN/1.73 M^2
GLUCOSE SERPL-MCNC: 108 MG/DL
HCT VFR BLD AUTO: 36.2 %
HGB BLD-MCNC: 12.1 G/DL
LYMPHOCYTES # BLD AUTO: 0.9 K/UL
LYMPHOCYTES NFR BLD: 5.1 %
MCH RBC QN AUTO: 29 PG
MCHC RBC AUTO-ENTMCNC: 33.4 G/DL
MCV RBC AUTO: 87 FL
MONOCYTES # BLD AUTO: 1.5 K/UL
MONOCYTES NFR BLD: 9.1 %
NEUTROPHILS # BLD AUTO: 14.1 K/UL
NEUTROPHILS NFR BLD: 84.8 %
PLATELET # BLD AUTO: 235 K/UL
PMV BLD AUTO: 10.3 FL
POTASSIUM SERPL-SCNC: 4.3 MMOL/L
PROT SERPL-MCNC: 5.6 G/DL
RBC # BLD AUTO: 4.17 M/UL
SODIUM SERPL-SCNC: 137 MMOL/L
WBC # BLD AUTO: 16.63 K/UL

## 2018-09-28 PROCEDURE — 94761 N-INVAS EAR/PLS OXIMETRY MLT: CPT

## 2018-09-28 PROCEDURE — 63600175 PHARM REV CODE 636 W HCPCS: Performed by: ANESTHESIOLOGY

## 2018-09-28 PROCEDURE — 25000003 PHARM REV CODE 250: Performed by: INTERNAL MEDICINE

## 2018-09-28 PROCEDURE — 85025 COMPLETE CBC W/AUTO DIFF WBC: CPT

## 2018-09-28 PROCEDURE — 36415 COLL VENOUS BLD VENIPUNCTURE: CPT

## 2018-09-28 PROCEDURE — 63600175 PHARM REV CODE 636 W HCPCS: Performed by: STUDENT IN AN ORGANIZED HEALTH CARE EDUCATION/TRAINING PROGRAM

## 2018-09-28 PROCEDURE — 25000003 PHARM REV CODE 250: Performed by: STUDENT IN AN ORGANIZED HEALTH CARE EDUCATION/TRAINING PROGRAM

## 2018-09-28 PROCEDURE — 11000001 HC ACUTE MED/SURG PRIVATE ROOM

## 2018-09-28 PROCEDURE — 93306 TTE W/DOPPLER COMPLETE: CPT

## 2018-09-28 PROCEDURE — 80053 COMPREHEN METABOLIC PANEL: CPT

## 2018-09-28 RX ORDER — LISINOPRIL 20 MG/1
20 TABLET ORAL DAILY
Status: DISCONTINUED | OUTPATIENT
Start: 2018-09-28 | End: 2018-09-29 | Stop reason: HOSPADM

## 2018-09-28 RX ADMIN — LISINOPRIL 20 MG: 20 TABLET ORAL at 08:09

## 2018-09-28 RX ADMIN — VENLAFAXINE 150 MG: 37.5 TABLET ORAL at 08:09

## 2018-09-28 RX ADMIN — QUETIAPINE 100 MG: 25 TABLET ORAL at 08:09

## 2018-09-28 RX ADMIN — RAMELTEON 8 MG: 8 TABLET, FILM COATED ORAL at 08:09

## 2018-09-28 RX ADMIN — SODIUM CHLORIDE: 0.9 INJECTION, SOLUTION INTRAVENOUS at 12:09

## 2018-09-28 RX ADMIN — ENOXAPARIN SODIUM 40 MG: 100 INJECTION SUBCUTANEOUS at 06:09

## 2018-09-28 RX ADMIN — PIPERACILLIN AND TAZOBACTAM 4.5 G: 4; .5 INJECTION, POWDER, LYOPHILIZED, FOR SOLUTION INTRAVENOUS; PARENTERAL at 08:09

## 2018-09-28 RX ADMIN — TRAMADOL HYDROCHLORIDE 50 MG: 50 TABLET, COATED ORAL at 08:09

## 2018-09-28 RX ADMIN — FAMOTIDINE 20 MG: 20 TABLET, FILM COATED ORAL at 08:09

## 2018-09-28 RX ADMIN — ONDANSETRON 4 MG: 2 INJECTION, SOLUTION INTRAMUSCULAR; INTRAVENOUS at 05:09

## 2018-09-28 RX ADMIN — TAMSULOSIN HYDROCHLORIDE 0.4 MG: 0.4 CAPSULE ORAL at 08:09

## 2018-09-28 NOTE — MEDICAL/APP STUDENT
[facility] Team A Progress Note - L3 Medical Student    [facility] Medicine Team A Progress Note - L3 Medical Student  Patient: Viv Srivastava  MRN: [id number]  : 1945  Admitted 18  LOS: 2    Subjective  ID: Viv Srivastava is a 73 yo F with past medical history of depression and GERD admitted for treatment of sepsis secondary to UTI, with s/p ureteral stent for nephrolithiasis and hydronephrosis of the right kidney.     Interval:  Yesterday she had elevated Bps to 154/97 @1100 yesterday up to 178/113 last night @ 2100, she was started on lisinopril 20mg PO qd this morning. Patient is comfortable, in no pain and afebrile. Per Urology, her brush cath was removed yesterday and she was started on flomax for passage of kidney stone. She is urinating normally now, with no pain or burning but reports her urine is dark. Denies any nausea vomiting, diarrhea, shortness of breath, chest pain or cough.     Objective  Vitals  Temp:  [97.6 °F (36.4 °C)-98.6 °F (37 °C)] 97.6 °F (36.4 °C)  Pulse:  [] 96  Resp:  [13-20] 15  SpO2:  [94 %-100 %] 94 %  BP: (103-179)/() 111/81      Physical Exam  Constitutional - NAD  HENT - multiple missing teeth. Normocephalic atraumatic. No cervical LAD  CV - Regular rate and rhythm. Normal s1/2, grade 3/6 systolic murmur most prominent at the 2nd right ICS.    Resp - Clear to auscultation bilaterally.   Abd - Soft, NT/ND, BS+ and normal.   - brush removed, urine dark clear yellow  Skin - warm and dry, no obvious lesions or rashes. Heavily sun exposed  Extremities - no edema or erythema  Neuro - awake and alert. Speech appropriate  Psych - affect normal.     Meds  Scheduled Meds:   enoxaparin  40 mg Subcutaneous Daily    famotidine  20 mg Oral Daily    lisinopril  20 mg Oral Daily    piperacillin-tazobactam (ZOSYN) IVPB  4.5 g Intravenous Q8H    QUEtiapine  100 mg Oral QHS    tamsulosin  0.4 mg Oral Daily    venlafaxine  150 mg Oral BID     Continuous  Infusions:   sodium chloride 0.9% 75 mL/hr at 09/27/18 2332     PRN Meds:.diphenhydrAMINE, HYDROmorphone, morphine, ondansetron, ondansetron, ramelteon, sodium chloride 0.9%, sodium chloride 0.9%, traMADol    Antibiotics - Zosyn, started 9/26 - current    Labs    Lab Results   Component Value Date     WBC 16.63 (H) 09/28/2018     HGB 12.1 09/28/2018     HCT 36.2 (L) 09/28/2018     MCV 87 09/28/2018      09/28/2018            Lab Results   Component Value Date     CREATININE 0.7 09/28/2018     BUN 12 09/28/2018      09/28/2018     K 4.3 09/28/2018      09/28/2018     CO2 21 (L) 09/28/2018            Lab Results   Component Value Date     ALT 19 09/28/2018     AST 16 09/28/2018     GGT 64 (H) 09/27/2018     ALKPHOS 123 09/28/2018     BILITOT 0.4 09/28/2018           Micro  Urine Cx - positive for E. Coli >100K CFUs; susceptibility pending  Blood Cx -   9/26 - 2 cultures drawn -1 positive for GNRs at 36hrs growth, 1 no growth   9/27 - 2 cultures drawn at time of positive previous culture (9/27/18, 1935PM); both no growth to date    Imaging  No new imaging    Assessment  Viv Srivastava is a 71 yo F s/p ureteral stent placement, admitted for sepsis secondary to UTI suspected due to renal calculus obstructing the R kidney UPJ with associated hydronephrosis. She is afebrile and in no pain, continues to be treated with IV Zosyn.     Problem List  -Sepsis secondary to UTI with gram negative asha bacteremia  -Hydronephrosis secondary to nephrolithiasis  -YESSICA  -Aortic stenosis  -Depression  -Hiatal hernia  -GERD  -Iron deficiency anemia      Plan  Sepsis secondary to UTI with bacteremia -  Treating with Zosyn, will need ABX 14d from negative culture  Nephrolithiasis and hydronephrosis of R kidney  Urology recs - Start flomax, remove brush, follow UCx for sensitivities. Pt needs f/u in clinic next week for stone removal procedure  YESSICA  - resolved with IVF and stenting; continue monitoring BUN and  Cr  Elevated BP - initiated lisinopril this AM    Dispo - pending negative blood culture and susceptibility possible discharge tomorrow; will need oupatient ABX

## 2018-09-28 NOTE — PROGRESS NOTES
[facility] Medicine Progress Note    Primary Team: [facility] Hospitalist Team A  Attending Physician: Annette Swain MD  Resident: Sonny  Intern: Beatriz    Subjective:      Patient states she felt pretty bad yesterday. She does feel better this morning, and states she slept well overnight. No overnight fevers.Patient denies any flank pain. Patient denying any chest pain, SOB, abdominal pain, swelling. Patient blood pressure controlled this morning after starting lisinopril. Vickers removed, no difficulties urinating, some dark urine.  Patient informed she may need to stay a few more days since her blood cultures positive, patient agreeable.     Objective:     Last 24 Hour Vital Signs:  BP  Min: 106/68  Max: 179/111  Temp  Av °F (36.7 °C)  Min: 97.6 °F (36.4 °C)  Max: 98.6 °F (37 °C)  Pulse  Av.6  Min: 76  Max: 95  Resp  Av.8  Min: 13  Max: 20  SpO2  Av %  Min: 94 %  Max: 100 %  Weight  Av.4 kg (137 lb 9.1 oz)  Min: 62.4 kg (137 lb 9.1 oz)  Max: 62.4 kg (137 lb 9.1 oz)  I/O last 3 completed shifts:  In: 5646.2 [I.V.:3514.2; IV Piggyback:2]  Out: 3020 [Urine:3020]    Physical Examination:  Physical Exam   Constitutional: She is oriented to person, place, and time.   No distress. Poor hygiene   HENT:   Head: Normocephalic and atraumatic.   Nose: Nose normal.   Mouth/Throat: No oropharyngeal exudate.   MMM. Poor dentition   Eyes: Conjunctivae are normal. Pupils are equal, round, and reactive to light. No scleral icterus.   Neck: Normal range of motion. Neck supple. No tracheal deviation present.   Cardiovascular: Normal rate, regular rhythm and intact distal pulses.   +3/6 midsystolic murmur appreciated in 2nd intercostal space of RSB and LSB  Pulmonary/Chest: Effort normal. No respiratory distress. She has no wheezes. CTAB  Abdominal: Soft. She exhibits no distension. There is no tenderness. There is no rebound and no guarding.   No CVA tenderness   Musculoskeletal: Normal range of motion.  She exhibits no edema or tenderness.   Lymphadenopathy:     She has no cervical adenopathy.   Neurological: She is alert and oriented to person, place, and time. Coordination normal.   Skin: Skin is warm and dry. No rash noted. She is not diaphoretic. No erythema.          Laboratory:  Laboratory Data Reviewed: yes  Pertinent Findings:  Lab Results   Component Value Date    WBC 16.63 (H) 09/28/2018    HGB 12.1 09/28/2018    HCT 36.2 (L) 09/28/2018    MCV 87 09/28/2018     09/28/2018     Lab Results   Component Value Date    CREATININE 0.7 09/28/2018    BUN 12 09/28/2018     09/28/2018    K 4.3 09/28/2018     09/28/2018    CO2 21 (L) 09/28/2018     Lab Results   Component Value Date    ALT 19 09/28/2018    AST 16 09/28/2018    GGT 64 (H) 09/27/2018    ALKPHOS 123 09/28/2018    BILITOT 0.4 09/28/2018       Microbiology Data Reviewed: yes  Pertinent Findings:  Blood Cx 9/26: GNR  Urine Cx: E coli, susceptibility pending  Blood Cx 9/27: NGTD    Other Results:  EKG (my interpretation):  NSR    Radiology Data Reviewed: yes  Pertinent Findings:  CT Renal: 6 mm stone in UP junction    Current Medications:     Infusions:   sodium chloride 0.9% 75 mL/hr at 09/27/18 2332        Scheduled:   enoxaparin  40 mg Subcutaneous Daily    famotidine  20 mg Oral Daily    lisinopril  10 mg Oral Daily    piperacillin-tazobactam (ZOSYN) IVPB  4.5 g Intravenous Q8H    QUEtiapine  100 mg Oral QHS    tamsulosin  0.4 mg Oral Daily    venlafaxine  150 mg Oral BID        PRN:  diphenhydrAMINE, HYDROmorphone, morphine, ondansetron, ondansetron, ramelteon, sodium chloride 0.9%, sodium chloride 0.9%, traMADol    Antibiotics and Day Number of Therapy:  Zosyn 9/26-present    Lines and Day Number of Therapy:  PIV 9/26-present    Assessment:     Viv Srivastava is a 72 y.o.female with  Patient Active Problem List    Diagnosis Date Noted    Iron deficiency anemia 09/27/2018    Hiatal hernia 09/27/2018    Severe sepsis  with acute organ dysfunction 09/26/2018    Hydronephrosis, right 09/26/2018    Acute cystitis with hematuria 09/26/2018    Obstruction of right ureteropelvic junction (UPJ) due to stone 09/26/2018    Depression 09/26/2018    YESSICA (acute kidney injury) 09/26/2018    Sepsis associated hypotension 09/26/2018    Severe sepsis 09/26/2018    Aortic stenosis 09/26/2018    Closed fracture of proximal end of left humerus 04/04/2016        Plan:     Severe Sepsis 2/2 to E Coli Urinary Tract Infection with GNR Bacteremia  -Patient with right flank pain X5 days, denies any urinary symptoms however UA showing 3+ leukocytes and 2+ protein and many bacteria  -qSOFA: 2/3  -Patient given boluses of total of 2.6 L NS in ER along with dose of Vancomycin and Zosyn.  -Patient initial WBC 1.6, RR 22, , BP of 59/43  -Initial lactate 4.1 ---> 1.2  -Bcx 9/26 growing GNR  Ucx 9/26 growing E coli  Bcx 9/27 NGTD  - Will continue Zosyn during hospital stay and wait for sensitivities     Nephrolithiasis with right hydronephrosis  -Patient with right flank pain X5 days  -CT Renal shows moderate right hydronephrosis with 6 mm calcification at ureteropelvic junction  -Urology placed stent on 9/27  -For pain giving tramadol 50 mg q6h prn and morphine 2 mg IV q6h prn  -Zofran for nausea     YESSICA secondary to complicated E coli UTI with obstructing right UPJ stone with right hydronephrosis, resolved  -Baseline Cr 0.60 with GFR > 60 on February 2016  -Creatinine 1.04, GFR 53.4 on 9/26/18  -Prerenal vs postrenal  FeNa 1.8  -On continuous NS at 75 ml/hr  -Cr of 0.70 on 9/28, resolved     Hypertension  -Patient without history of hypertension, states last office visits have been at 120s/80s  -After fluids started BP has been elevated, have been lowering rate of IV fluid infusion  -Started Lisinopril 20 mg daily on 9/27    Iron Deficiency Anemia  -On 9/27 Hb 11/Hct 35/ MCV 88  -Likely dilutional secondary to IV fluids  -Labs consistent  with JEY    Mild Aortic Stenosis  -RSB murmur on exam, patient asymptomatic  -ECHO with SKY of 1.53 cm2 in July 2017    Hiatal Hernia  -Incidental finding on imaging of large hiatal hernia  -Patient at home does not have symptoms of indigestion  -Complaining of indegestion at this time, will give to famotidine while here in hospital, consider follow up with PCP if long term symptoms occur     Depression  -Continue home venlafaxine 150 mg BID and Seroquel 100 mg nightly     Fluids: NS at 75 ml/hr  Electrolytes: monitor  Nutrition: Full liquid diet, regular diet for lunch  DVT PPx: Heparin     Dispo: Await for sensititivies from urine and blood cx     Code Status:      Full      Guillaume Henderson,   hospitals Internal Medicine HO-1    hospitals Medicine Hospitalist Pager numbers:   hospitals Hospitalist Medicine Team A (Robinson/Brynn): [phone]  hospitals Hospitalist Medicine Team B (Nicole/Eileen):  [phone]

## 2018-09-28 NOTE — NURSING
Transferred pt to Rm 467. Gave report to OSKAR Chaparro. Transported via  with son at side. Pt belongings gathered and carred per son. Pt was on Room Air. IVF on NS at 75 yo LFA PIV.

## 2018-09-28 NOTE — PLAN OF CARE
Pt AAOx4. Clear fluent speech, and follows commands. BP elevated throughout shift. One time order of lisinopril ordered. Pt turns independently. Pt voids in BSC without difficulty with supervision. Answered all questions and concerns. ABT therapy in progress, and temperature monitoring. Pt remained afebrile all shift. Active bowel sounds audible in all quads. Bed locked in lowest position. Bed alarm active and call light within reach. Instructed to call for assistance with BSC, pt verbalized understanding. Will continue to monitor

## 2018-09-28 NOTE — NURSING
Called [facility] regarding pt elevated BP's. Informed team about pt c/o indigestion and overall feeling of discomfort. Team instructed nurse to wait until next elevated BP to call back for intervention.    2030 Paged [facility] regarding BP again. Team called back at 15 mins later stating they would put an order in for lisinopril 10mg po x1 dose. Pt informed awaiting order. PRN zofran given for c/o nausea. Will continue to monitor.

## 2018-09-29 VITALS
BODY MASS INDEX: 21.59 KG/M2 | RESPIRATION RATE: 16 BRPM | OXYGEN SATURATION: 96 % | HEART RATE: 88 BPM | WEIGHT: 137.56 LBS | DIASTOLIC BLOOD PRESSURE: 76 MMHG | TEMPERATURE: 98 F | SYSTOLIC BLOOD PRESSURE: 119 MMHG | HEIGHT: 67 IN

## 2018-09-29 PROBLEM — B96.20 E COLI BACTEREMIA: Status: ACTIVE | Noted: 2018-09-28

## 2018-09-29 LAB
ALBUMIN SERPL BCP-MCNC: 2.1 G/DL
ALP SERPL-CCNC: 73 U/L
ALT SERPL W/O P-5'-P-CCNC: 11 U/L
ANION GAP SERPL CALC-SCNC: 9 MMOL/L
AORTIC VALVE STENOSIS: ABNORMAL
AST SERPL-CCNC: 10 U/L
BACTERIA BLD CULT: NORMAL
BASOPHILS # BLD AUTO: 0 K/UL
BASOPHILS NFR BLD: 0 %
BILIRUB SERPL-MCNC: 0.4 MG/DL
BUN SERPL-MCNC: 14 MG/DL
CALCIUM SERPL-MCNC: 7.5 MG/DL
CHLORIDE SERPL-SCNC: 109 MMOL/L
CO2 SERPL-SCNC: 20 MMOL/L
CREAT SERPL-MCNC: 0.6 MG/DL
DIASTOLIC DYSFUNCTION: YES
DIFFERENTIAL METHOD: ABNORMAL
EOSINOPHIL # BLD AUTO: 0 K/UL
EOSINOPHIL NFR BLD: 0.4 %
ERYTHROCYTE [DISTWIDTH] IN BLOOD BY AUTOMATED COUNT: 14.4 %
EST. GFR  (AFRICAN AMERICAN): >60 ML/MIN/1.73 M^2
EST. GFR  (NON AFRICAN AMERICAN): >60 ML/MIN/1.73 M^2
ESTIMATED PA SYSTOLIC PRESSURE: 23.61
GLUCOSE SERPL-MCNC: 140 MG/DL
HCT VFR BLD AUTO: 32.2 %
HGB BLD-MCNC: 11 G/DL
LYMPHOCYTES # BLD AUTO: 1 K/UL
LYMPHOCYTES NFR BLD: 11.2 %
MAGNESIUM SERPL-MCNC: 1.3 MG/DL
MCH RBC QN AUTO: 29.1 PG
MCHC RBC AUTO-ENTMCNC: 34.2 G/DL
MCV RBC AUTO: 85 FL
MONOCYTES # BLD AUTO: 1.2 K/UL
MONOCYTES NFR BLD: 12.9 %
NEUTROPHILS # BLD AUTO: 6.8 K/UL
NEUTROPHILS NFR BLD: 74.4 %
PLATELET # BLD AUTO: 252 K/UL
PMV BLD AUTO: 9.9 FL
POTASSIUM SERPL-SCNC: 3.1 MMOL/L
PROT SERPL-MCNC: 4.4 G/DL
RBC # BLD AUTO: 3.78 M/UL
RETIRED EF AND QEF - SEE NOTES: 20 (ref 55–65)
SODIUM SERPL-SCNC: 138 MMOL/L
TRICUSPID VALVE REGURGITATION: ABNORMAL
WBC # BLD AUTO: 9.18 K/UL

## 2018-09-29 PROCEDURE — 63600175 PHARM REV CODE 636 W HCPCS: Performed by: STUDENT IN AN ORGANIZED HEALTH CARE EDUCATION/TRAINING PROGRAM

## 2018-09-29 PROCEDURE — 80053 COMPREHEN METABOLIC PANEL: CPT

## 2018-09-29 PROCEDURE — 25000003 PHARM REV CODE 250

## 2018-09-29 PROCEDURE — 94761 N-INVAS EAR/PLS OXIMETRY MLT: CPT

## 2018-09-29 PROCEDURE — 36415 COLL VENOUS BLD VENIPUNCTURE: CPT

## 2018-09-29 PROCEDURE — 25000003 PHARM REV CODE 250: Performed by: STUDENT IN AN ORGANIZED HEALTH CARE EDUCATION/TRAINING PROGRAM

## 2018-09-29 PROCEDURE — 25000003 PHARM REV CODE 250: Performed by: INTERNAL MEDICINE

## 2018-09-29 PROCEDURE — 85025 COMPLETE CBC W/AUTO DIFF WBC: CPT

## 2018-09-29 PROCEDURE — 83735 ASSAY OF MAGNESIUM: CPT

## 2018-09-29 RX ORDER — PANTOPRAZOLE SODIUM 40 MG/1
40 TABLET, DELAYED RELEASE ORAL DAILY
Qty: 90 TABLET | Refills: 0 | Status: SHIPPED | OUTPATIENT
Start: 2018-09-29 | End: 2018-09-29 | Stop reason: SDUPTHER

## 2018-09-29 RX ORDER — TAMSULOSIN HYDROCHLORIDE 0.4 MG/1
0.4 CAPSULE ORAL DAILY
Qty: 30 CAPSULE | Refills: 11 | Status: SHIPPED | OUTPATIENT
Start: 2018-09-29 | End: 2018-09-29

## 2018-09-29 RX ORDER — PANTOPRAZOLE SODIUM 40 MG/1
40 TABLET, DELAYED RELEASE ORAL DAILY
Qty: 90 TABLET | Refills: 0 | Status: SHIPPED | OUTPATIENT
Start: 2018-09-29 | End: 2018-12-28

## 2018-09-29 RX ORDER — TAMSULOSIN HYDROCHLORIDE 0.4 MG/1
0.4 CAPSULE ORAL DAILY
Qty: 30 CAPSULE | Refills: 11 | Status: SHIPPED | OUTPATIENT
Start: 2018-09-29 | End: 2019-09-29

## 2018-09-29 RX ORDER — CIPROFLOXACIN 500 MG/1
500 TABLET ORAL 2 TIMES DAILY
Qty: 24 TABLET | Refills: 0 | Status: ON HOLD | OUTPATIENT
Start: 2018-09-29 | End: 2018-10-03 | Stop reason: SDUPTHER

## 2018-09-29 RX ORDER — POTASSIUM CHLORIDE 20 MEQ/1
20 TABLET, EXTENDED RELEASE ORAL
Status: DISCONTINUED | OUTPATIENT
Start: 2018-09-29 | End: 2018-09-29 | Stop reason: HOSPADM

## 2018-09-29 RX ADMIN — PIPERACILLIN AND TAZOBACTAM 4.5 G: 4; .5 INJECTION, POWDER, LYOPHILIZED, FOR SOLUTION INTRAVENOUS; PARENTERAL at 12:09

## 2018-09-29 RX ADMIN — SODIUM CHLORIDE: 0.9 INJECTION, SOLUTION INTRAVENOUS at 01:09

## 2018-09-29 RX ADMIN — VENLAFAXINE 150 MG: 37.5 TABLET ORAL at 08:09

## 2018-09-29 RX ADMIN — LISINOPRIL 20 MG: 20 TABLET ORAL at 08:09

## 2018-09-29 RX ADMIN — TAMSULOSIN HYDROCHLORIDE 0.4 MG: 0.4 CAPSULE ORAL at 08:09

## 2018-09-29 RX ADMIN — POTASSIUM CHLORIDE 20 MEQ: 20 TABLET, EXTENDED RELEASE ORAL at 10:09

## 2018-09-29 RX ADMIN — POTASSIUM CHLORIDE 20 MEQ: 20 TABLET, EXTENDED RELEASE ORAL at 08:09

## 2018-09-29 RX ADMIN — FAMOTIDINE 20 MG: 20 TABLET, FILM COATED ORAL at 08:09

## 2018-09-29 RX ADMIN — PIPERACILLIN AND TAZOBACTAM 4.5 G: 4; .5 INJECTION, POWDER, LYOPHILIZED, FOR SOLUTION INTRAVENOUS; PARENTERAL at 08:09

## 2018-09-29 NOTE — PLAN OF CARE
09/29/18 0815   PRE-TX-O2-ETCO2   O2 Device (Oxygen Therapy) room air   SpO2 96 %   $ Pulse Oximetry - Multiple Charge Pulse Oximetry - Multiple

## 2018-09-29 NOTE — DISCHARGE SUMMARY
hospitals Hospital Medicine Discharge Summary    Primary Team: hospitals Hospitalist Team A  Attending Physician: Annette Swain MD  Resident: Carlyle  Intern: Miguel A    Date of Admit: 9/26/2018  Date of Discharge: 9/29/2018    Discharge to: Home  Condition: Improved    Discharge Diagnoses     Patient Active Problem List   Diagnosis    Closed fracture of proximal end of left humerus    Severe sepsis with acute organ dysfunction    Hydronephrosis, right    E-coli UTI    Obstruction of right ureteropelvic junction (UPJ) due to stone    Depression    YESSICA (acute kidney injury)    Sepsis associated hypotension    Severe sepsis    Aortic stenosis    Iron deficiency anemia    Hiatal hernia    Hypertension    Gram-negative bacteremia       Consultants and Procedures     Consultants:  Urology    Procedures:   Ureteral stent placed 9/27    Imaging:  CXR 9/26 Borderline heart size. Clear lungs.    CT renal 9/26 Moderate hydronephrosis involves the right kidney with evidence of a nonobstructing stone in the lower pole. There is a 6 mm calcification inferior 0 medial to the right kidney. Moderately large hiatal hernia. No bowel obstruction or free air.     Retrograde pyelogram 9/26     Brief History of Present Illness     Patient is a 72 year old female with depression and heart murmur who presented to [facility] ER on morning of 9/26 with complaint of right sided flank pain for 5 days. Patient reports symptoms started on Friday, they waxed and waned over weekend and patient believed she was getting better. However 2 days PTA she began having subjective fever and chills. She states that last night was just a real bad night as far as the pain and chills were going, so she decided to show up to ER in morning. Patient denies any urinary symptoms during this time, including frequency, burning, hematuria, or urgency. Patient does admit to polydipsia during this time. Patient also admits to a sore throat yesterday. Patient  "denies diarrhea, states last BM 2 days ago was looser than normal. Patient denies any sick contacts.     In ER at [facility] patient was very drowsy and stating "could you just give me a pain pill." Patient initial vitals were 98.2 F, , Resp 20, BP of 59/43. Patient given boluses of total of 2.6 L NS. Was also given dose of Vancomycin and Zosyn. For pain she was given ketorolac. Patient sent to [facility].       Hospital Course By Problem with Pertinent Findings     Severe Sepsis 2/2 to E Coli Urinary Tract Infection with E coli Bacteremia  -Patient with right flank pain X5 days, denies any urinary symptoms however UA showing 3+ leukocytes and 2+ protein and many bacteria  -qSOFA: 2/3  -Patient given boluses of total of 2.6 L NS in ER along with dose of Vancomycin and Zosyn.  -Patient initial WBC 1.6, RR 22, , BP of 59/43  -Initial lactate 4.1 ---> 1.2  -Bcx 9/26 growing E coli, pan-sensitive  Ucx 9/26 growing E coli, pan-sensitive  Bcx 9/27 NGTD  -Zosyn during hospital stay > home on oral ciprofloxacin     Nephrolithiasis with right hydronephrosis  -Patient with right flank pain x5 days  -CT Renal shows moderate right hydronephrosis with 6 mm calcification at ureteropelvic junction  -Urology placed stent on 9/27  -For pain giving tramadol 50 mg q6h prn and morphine 2 mg IV q6h prn  -Zofran prn for nausea while inpatient, nausea resolved at discharge  -pt to f/u with Dr. Gill (urology) outpatient     YESSICA secondary to complicated E coli UTI with obstructing right UPJ stone with right hydronephrosis, resolved  -Baseline Cr 0.60 with GFR > 60 on February 2016  -Creatinine 1.04, GFR 53.4 on 9/26/18  -Prerenal vs postrenal  FeNa 1.8  -On continuous NS at 75 ml/hr  -Cr of 0.70 on 9/28, resolved     Hypertension  -Patient without history of hypertension, states last office visits have been at 120s/80s  -After fluids started BP has been elevated, have been lowering rate of IV fluid " infusion  -Started Lisinopril 20 mg daily on 9/27 > will not cont outpatient, pt to f/u BP with PCP     Iron Deficiency Anemia  -On 9/27 Hb 11/Hct 35/ MCV 88  -Likely dilutional secondary to IV fluids  -Labs consistent with JEY     Mild Aortic Stenosis  -RSB murmur on exam, patient asymptomatic  -ECHO with SKY of 1.53 cm2 in July 2017  -repeat ECHO pending at time of discharge, will follow up     Hiatal Hernia  -Incidental finding on imaging of large hiatal hernia  -Patient at home does not have symptoms of indigestion  -Complaining of indegestion at this time, famotidine while here in hospital helped sx  -sending home with daily PPI > follow up with PCP      Depression  -Continue home venlafaxine 150 mg BID and Seroquel 100 mg nightly      Discharge Medications      Viv Srivastava   Home Medication Instructions DERIAN:[id number]    Printed on:09/29/18 1018   Medication Information                      aspirin 325 MG tablet  Take 325 mg by mouth once daily.             ciprofloxacin HCl (CIPRO) 500 MG tablet  Take 1 tablet (500 mg total) by mouth 2 (two) times daily.             pantoprazole (PROTONIX) 40 MG tablet  Take 1 tablet (40 mg total) by mouth once daily.             quetiapine (SEROQUEL) 100 MG Tab  Take by mouth every evening.             tamsulosin (FLOMAX) 0.4 mg Cap  Take 1 capsule (0.4 mg total) by mouth once daily.             venlafaxine (EFFEXOR) 50 MG Tab  Take 150 mg by mouth 2 (two) times daily.                   Discharge Information:   Diet:  Regular    Physical Activity:  As tolerated             Instructions:  1. Take all medications as prescribed  2. Keep all follow-up appointments  3. Return to the hospital or call your primary care physicians if any worsening symptoms such as fever, chest pain, shortness of breath, return of symptoms, or any other concerns.    Follow-Up Appointments:  F/u 1-2 weeks with PCP and urologist, Dr. Jairo Reid MD  \[facility]\"" Internal Medicine,  HO-I

## 2018-09-29 NOTE — DISCHARGE SUMMARY
"[facility] Hospital Medicine Discharge Summary    Primary Team: [facility] Hospitalist Team A  Attending Physician: Dixie att. providers found  Resident: Carlyle  Intern: Miguel A    Date of Admit: 9/26/2018  Date of Discharge: 9/29/2018    Discharge to: home  Condition: stable    Discharge Diagnoses     Patient Active Problem List   Diagnosis    Closed fracture of proximal end of left humerus    Severe sepsis with acute organ dysfunction    Hydronephrosis, right    E-coli UTI    Obstruction of right ureteropelvic junction (UPJ) due to stone    Depression    YESSICA (acute kidney injury)    Sepsis associated hypotension    Severe sepsis    Aortic stenosis    Iron deficiency anemia    Hiatal hernia    Hypertension    E coli bacteremia    Gastroesophageal reflux disease       Consultants and Procedures     Consultants:  Urology    Procedures:   9/26/18: Cystoscopy with R Ureteral Stent Placement    Imaging:  CT Renal: 6 mm stone in UP junction    Brief History of Present Illness      Patient is a 72 year old female with depression and heart murmur who presented to [facility] ER on morning of 9/26 with complaint of right sided flank pain for 5 days. Patient reports symptoms started on Friday, they waxed and waned over weekend and patient believed she was getting better. However 2 days PTA she began having subjective fever and chills. She states that last night was just a real bad night as far as the pain and chills were going, so she decided to show up to ER in morning. Patient denies any urinary symptoms during this time, including frequency, burning, hematuria, or urgency. Patient does admit to polydipsia during this time. Patient also admits to a sore throat yesterday. Patient denies diarrhea, states last BM 2 days ago was looser than normal. Patient denies any sick contacts.     In ER at [facility] patient was very drowsy and stating "could you just give me a pain pill." Patient initial vitals were 98.2 F, , " Resp 20, BP of 59/43. Patient given boluses of total of 2.6 L NS. Was also given dose of Vancomycin and Zosyn. For pain she was given ketorolac. Patient sent to [facility].    For the full HPI please refer to the History & Physical from this admission.    Hospital Course By Problem with Pertinent Findings     Severe Sepsis 2/2 to E Coli Urinary Tract Infection with E coli Bacteremia  -Patient with right flank pain X5 days, denies any urinary symptoms however UA showing 3+ leukocytes and 2+ protein and many bacteria  -qSOFA: 2/3  -Patient given boluses of total of 2.6 L NS in ER along with dose of Vancomycin and Zosyn.  -Patient initial WBC 1.6, RR 22, , BP of 59/43  -Initial lactate 4.1 ---> 1.2  -Bcx 9/26 growing E coli, pan-sensitive  Ucx 9/26 growing E coli, pan-sensitive  Bcx 9/27 NGTD  -Zosyn during hospital stay > switched to po cipro to complete a 14 day course from day of negative BCx     Nephrolithiasis with right hydronephrosis  -Patient with right flank pain x5 days  -CT Renal shows moderate right hydronephrosis with 6 mm calcification at ureteropelvic junction  -Urology placed stent on 9/27  -For pain giving tramadol 50 mg q6h prn and morphine 2 mg IV q6h prn  -Zofran prn for nausea  - to follow up with Urology outpatient     YESSICA secondary to complicated E coli UTI with obstructing right UPJ stone with right hydronephrosis, resolved  -Baseline Cr 0.60 with GFR > 60 on February 2016  -Creatinine 1.04, GFR 53.4 on 9/26/18  -Prerenal vs postrenal  FeNa 1.8  -was on continuous NS at 75 ml/hr  -Cr of 0.70 on 9/28, resolved     Hypertension  -Patient without history of hypertension, states last office visits have been at 120s/80s  -After fluids started BP has been elevated, have been lowering rate of IV fluid infusion  -Started Lisinopril 20 mg daily on 9/27, to continue as outpatient     Iron Deficiency Anemia  -On 9/27 Hb 11/Hct 35/ MCV 88  -Labs consistent with JEY  - will defer iron therapy  to PCP, to follow up     Mild Aortic Stenosis  -RSB murmur on exam, patient asymptomatic  -ECHO with SKY of 1.53 cm2 in July 2017  -TTE this admission Moderate aortic stenosis, SKY = 1.45 cm2, AVAi = 0.84 cm2/m2, peak velocity = 2.35 m/s, mean gradient = 13 mmHg.     2 - Severely depressed left ventricular systolic function (EF 20-25%).     Acute HFrEF 2/2 Takotsubo Cardiomyopathy  - TTE this admission with new depressed EF when compared to TTE 1 year ago  - Aortic stenosis has not worsened and with her recent infection, a transient cardiomyopathy is reasonably possible.    - asymptomatic, not retaining fluid  - Would recommend a repeat Echo in a few weeks  - With hypotension would not tolerate even low dose BB or ACEi at this time  - To be evaluated by PCP, if repeat echo still shows depressed EF and her BP improves, would recommend starting BB and ACEi, statin     Hiatal Hernia  -Incidental finding on imaging of large hiatal hernia  -Patient at home does not have symptoms of indigestion  -Complaining of indegestion  - daily PPI at discharge     Depression  -Continue home venlafaxine 150 mg BID and Seroquel 100 mg nightly        Discharge Medications      Viv Srivastava   Home Medication Instructions DERIAN:[id number]    Printed on:09/29/18 1207   Medication Information                      aspirin 325 MG tablet  Take 325 mg by mouth once daily.             ciprofloxacin HCl (CIPRO) 500 MG tablet  Take 1 tablet (500 mg total) by mouth 2 (two) times daily.             pantoprazole (PROTONIX) 40 MG tablet  Take 1 tablet (40 mg total) by mouth once daily.             quetiapine (SEROQUEL) 100 MG Tab  Take by mouth every evening.             tamsulosin (FLOMAX) 0.4 mg Cap  Take 1 capsule (0.4 mg total) by mouth once daily.             venlafaxine (EFFEXOR) 50 MG Tab  Take 150 mg by mouth 2 (two) times daily.                 Discharge Information:   Diet:  regular    Physical Activity:  As tolerated              Instructions:  1. Take all medications as prescribed  2. Keep all follow-up appointments  3. Return to the hospital or call your primary care physicians if any worsening symptoms such as fever, chest pain, shortness of breath, return of symptoms, or any other concerns.    Follow-Up Appointments:  PCP within 1-2 weeks  Urology - Dr. Gill in 1 week    Kenny Grady MD  [facility] Internal Medicine, -

## 2018-09-29 NOTE — PLAN OF CARE
Problem: Patient Care Overview  Goal: Plan of Care Review  Outcome: Ongoing (interventions implemented as appropriate)  Plan of care reviewed with patient and son. Verbalized understanding. No distress noted. Will continue to monitor. On telemetry, no red alarms or ectopy.NSR, Hr 90s, will continue to monitor.Bed alarm active, bed in lowest position, call light within reach. Patient instructed to use call light for assistance. Verbalized understanding. Bed rails up x2. NS infusing @ 75cc. IV ATB given during shift.

## 2018-09-29 NOTE — PLAN OF CARE
Discharge orders noted, no HH or HME ordered.    Pt's nurse will go over medications/signs and symptoms prior to discharge       09/29/18 1036   Final Note   Assessment Type Final Discharge Note   Discharge Disposition Home   What phone number can be called within the next 1-3 days to see how you are doing after discharge? [phone]   Hospital Follow Up  Appt(s) scheduled? No  (Offices closed for weekend. Patient to schedule own follow up appointment.)   Right Care Referral Info   Post Acute Recommendation No Care     Lazara Story RN Transitional Navigator  [phone]

## 2018-10-01 ENCOUNTER — TELEPHONE (OUTPATIENT)
Dept: UROLOGY | Facility: CLINIC | Age: 73
End: 2018-10-01

## 2018-10-01 DIAGNOSIS — N20.0 NEPHROLITHIASIS: ICD-10-CM

## 2018-10-01 DIAGNOSIS — N39.0 URINARY TRACT INFECTION WITHOUT HEMATURIA, SITE UNSPECIFIED: ICD-10-CM

## 2018-10-01 DIAGNOSIS — N20.1 URETERAL CALCULUS: Primary | ICD-10-CM

## 2018-10-01 LAB — BACTERIA BLD CULT: NORMAL

## 2018-10-01 RX ORDER — CIPROFLOXACIN 2 MG/ML
400 INJECTION, SOLUTION INTRAVENOUS
Status: CANCELLED | OUTPATIENT
Start: 2018-10-01

## 2018-10-01 RX ORDER — SODIUM CHLORIDE 9 MG/ML
INJECTION, SOLUTION INTRAVENOUS CONTINUOUS
Status: CANCELLED | OUTPATIENT
Start: 2018-10-01

## 2018-10-01 NOTE — TELEPHONE ENCOUNTER
Spoke with Ms Nam she would like her stent removed Wednesday, I informed Dr Gill and she  is scheduling her Surgery for Wednesday Oct 3,2018

## 2018-10-01 NOTE — H&P (VIEW-ONLY)
[place] - Urology  [facility] [place]  Urology History and Physical    Patient Name: Viv Srivastava  MRN: [id number]  Code Status: [unfilled]   Attending Provider: Irma Gill MD  Primary Care Physician: Mu Mcfadden MD  Principal Problem:[unfilled]    Subjective:     HPI: 72 y.o. female with past medical history below that I first met as an inpatient consult on 9/26/18 with her first episode of kidney stones. She states she has never had kidney stones to her knowledge prior. She was experiencing worsening right flank pain and presented to [facility] ER. She also notes subjective chills.     She underwent a CT scan on 9/26/18 - CT scan reviewed - proximal 6mm ureteral calculus with hydroureteronephrosis down to the level of the calcification.      72 y.o. female with right flank pain, 6mm proximal ureteral stone, proximal hydroureteronephrosis, abnormal WBC count, abnormal urinalysis suspicious for a UTI. Due to these findings concerning for a septic obstructing stone, I placed a right stent, cystoscopy, retrograde pyelogram on 9/26/18. She was discharged home. The patient called stating that getting rides back and forth is tough for her and she would like to proceed with definitive stone surgery this Wed 10/3/18.     She has been doing well as an outpatient and has been taking cipro.       Past Medical History:   Diagnosis Date    Depression        Past Surgical History:   Procedure Laterality Date    CYSTOSCOPY W/ URETERAL STENT PLACEMENT Right 9/26/2018    Procedure: CYSTOSCOPY, WITH Right URETERAL STENT INSERTION, Right Retrograde Pyelogram;  Surgeon: Irma Gill MD;  Location: [facility] OR;  Service: Urology;  Laterality: Right;    CYSTOSCOPY, WITH Right URETERAL STENT INSERTION, Right Retrograde Pyelogram Right 9/26/2018    Performed by rIma Gill MD at [facility] OR    TONSILLECTOMY         Current Outpatient Medications on File Prior to Visit   Medication Sig Dispense Refill     aspirin 325 MG tablet Take 325 mg by mouth once daily.      ciprofloxacin HCl (CIPRO) 500 MG tablet Take 1 tablet (500 mg total) by mouth 2 (two) times daily. 24 tablet 0    pantoprazole (PROTONIX) 40 MG tablet Take 1 tablet (40 mg total) by mouth once daily. 90 tablet 0    quetiapine (SEROQUEL) 100 MG Tab Take by mouth every evening.      tamsulosin (FLOMAX) 0.4 mg Cap Take 1 capsule (0.4 mg total) by mouth once daily. 30 capsule 11    venlafaxine (EFFEXOR) 50 MG Tab Take 150 mg by mouth 2 (two) times daily.       No current facility-administered medications on file prior to visit.        Review of patient's allergies indicates:  No Known Allergies    Family History     None          Tobacco Use    Smoking status: Never Smoker    Smokeless tobacco: Never Used   Substance and Sexual Activity    Alcohol use: No     Frequency: Never     Comment: stopped drinking 3 years ago    Drug use: No    Sexual activity: Not on file       Review of Systems   Constitutional: Negative for diaphoresis.   HENT: Negative for facial swelling.    Eyes: Negative for visual disturbance.   Respiratory: Negative for shortness of breath.    Cardiovascular: Negative for chest pain.   Gastrointestinal: Negative for abdominal distention.   Musculoskeletal: Negative for gait problem.   Skin: Negative for color change.   Neurological: Negative for speech difficulty.   Hematological: Does not bruise/bleed easily.   Psychiatric/Behavioral: Negative for agitation and behavioral problems.       Objective:     There were no vitals filed for this visit.      [unfilled]       Lines/Drains/Airways          None          Physical Exam   Constitutional: She is oriented to person, place, and time. She appears well-developed and well-nourished.   HENT:   Head: Normocephalic and atraumatic.   Eyes: EOM are normal. Pupils are equal, round, and reactive to light.   Neck: Normal range of motion. Neck supple.   Cardiovascular: Intact distal pulses.     Pulmonary/Chest: Effort normal. No respiratory distress.   Abdominal: Soft. She exhibits no distension. There is no tenderness.   Musculoskeletal: Normal range of motion. She exhibits no edema.   Neurological: She is alert and oriented to person, place, and time.   Skin: Skin is warm and dry.     Psychiatric: She has a normal mood and affect. Her behavior is normal.       Significant Labs:  BMP:  @LABRCNTIP(NA:3,K:3,CL:3,co2:3,bun:3,creatinine:3,labglom:3,glucose,calcium:3)@    CBC:  @LABRCNTIP(wbc:3,hgb:3,hct:3,PLT:3)@    All pertinent labs results from the past 24 hours have been reviewed.  Recent Lab Results     None          Significant Imaging:       Assessment and Plan:     1. I have explained the indication and benefits of proceeding with a right  ureteroscopy, holmium laser lithotripsy, retrograde pyelogram and stent exchange with me in the operating room on 10/3/18. Alternatives of the procedure were also discussed. The risks included but were not limited to pain, infection (urinary tract infection), bleeding (hematuria), ureteral or urethral stricture,  injury to the urethra, bladder, ureter, need for additional treatments, inability or incomplete removal of kidney stones, pain, and discomfort related to the stent were discussed in depth with the patient.  The patient understands that if I am unable to pass the cameras up to the level of the stone or if visibility is poor, then I will only place a left ureteral stent and pursue a staged procedure.     The ureteral stent was discussed at length. The patient will need to have it removed and the time period in which it should remain indwelling is to be determined after surgery. If left indwelling, the sequelae include pain, infection, lower urinary tract symptoms, development of calcifications on the ureteral stent, worsening kidney function, and complete loss of kidney function.     The patient voiced understanding and all questions have been answered and  informed consents were signed.  2. She will take cipro continuously until the surgery this Wednesday.   3. She has ride limitations and met anesthesia last week, therefore no preop anesthesia appointment necessary.     Ureteral calculus  -     Case Request Operating Room: REMOVAL, CALCULUS, URETER, URETEROSCOPIC, holmium laser lithotripsy, stent exchange, retrograde pyelogram  -     Place in Outpatient; Standing  -     Vital Signs ; Standing  -     Insert peripheral IV; Standing  -     Place sequential compression device; Standing    Nephrolithiasis  -     Case Request Operating Room: REMOVAL, CALCULUS, URETER, URETEROSCOPIC, holmium laser lithotripsy, stent exchange, retrograde pyelogram  -     Place in Outpatient; Standing  -     Vital Signs ; Standing  -     Insert peripheral IV; Standing  -     Place sequential compression device; Standing    Urinary tract infection without hematuria, site unspecified  -     Case Request Operating Room: REMOVAL, CALCULUS, URETER, URETEROSCOPIC, holmium laser lithotripsy, stent exchange, retrograde pyelogram  -     Place in Outpatient; Standing  -     Vital Signs ; Standing  -     Insert peripheral IV; Standing  -     Place sequential compression device; Standing    Other orders  -     IP VTE HIGH RISK PATIENT; Standing  -     0.9%  NaCl infusion; Inject into the vein continuous.  -     ciprofloxacin (CIPRO)400mg/200ml D5W IVPB 400 mg; Inject 200 mLs (400 mg total) into the vein On call Procedure (surgery).          Irma Gill MD  Urology  [place] - Urology

## 2018-10-01 NOTE — PHYSICIAN QUERY
"PT Name: Viv Srivastava  MR #: [id number]    Physician Query Form -Present on Admission (POA) Diagnosis Clarification     CDS: Maye Ponce RN, CCDS         Contact information :[phone])  [email or web address]       This form is a permanent document in the medical record.     Query Date: October 1, 2018    By submitting this query, we are merely seeking further clarification of documentation. Please utilize your independent clinical judgment when addressing the question(s) below.       The Medical record contains the following:    Diagnosis      Supporting Clinical Information   Location in Medical Record     "Acute HFrEF 2/2 Takotsubo Cardiomyopathy"                      "- TTE this admission with new depressed EF when compared to TTE 1 year ago  - Aortic stenosis has not worsened and with her recent infection, a transient cardiomyopathy is reasonably possible.    - asymptomatic, not retaining fluid  - Would recommend a repeat Echo in a few weeks  - With hypotension would not tolerate even low dose BB or ACEi at this time  - To be evaluated by PCP, if repeat echo still shows depressed EF and her BP improves, would recommend starting BB and ACEi, statin"    "Cardiovascular: Normal rate, regular rhythm and intact distal pulses.   +2/6 midsystolic murmur appreciated in 2nd intercostal space of RSB and LSB   Pulmonary/Chest: Effort normal. No respiratory distress. She has no wheezes.   Bilateral lower lobe crackles"    "Patient initially presented severely hypotensive after receiving approximately 1200 cc of fluid blood pressure stabilized.  Upon re-evaluation has remained stable for about 2 hr."    -91   BP 59//69  O2 Sat 94-89%     Discharge summary 9/29/18        Discharge summary 9/29/18                                  H&P 9/26/18                    ED provider note 9/26/18            VS record 9/26@[id number]         Doctor, Please specify Present On Admission (POA) status of  Acute HFrEF " 2/2 Takotsubo Cardiomyopathy     .  [x  ] Present on Admission   [  ] Not Present on Admission  [  ] Clinically undetermined

## 2018-10-01 NOTE — TELEPHONE ENCOUNTER
----- Message from Mirtha Malagon sent at 10/1/2018  9:09 AM CDT -----  Contact: [phone] or [phone]/ self    Patient requesting to speak with you regarding scheduling appt for stent removal. Pt is requesting to be seen on Wednesday 10/3/18. Please advise.

## 2018-10-01 NOTE — H&P
[place] - Urology  [facility] [place]  Urology History and Physical    Patient Name: Viv Srivastava  MRN: [id number]  Code Status: [unfilled]   Attending Provider: Irma Gill MD  Primary Care Physician: Mu Mcfadden MD  Principal Problem:[unfilled]    Subjective:     HPI: 72 y.o. female with past medical history below that I first met as an inpatient consult on 9/26/18 with her first episode of kidney stones. She states she has never had kidney stones to her knowledge prior. She was experiencing worsening right flank pain and presented to [facility] ER. She also notes subjective chills.     She underwent a CT scan on 9/26/18 - CT scan reviewed - proximal 6mm ureteral calculus with hydroureteronephrosis down to the level of the calcification.      72 y.o. female with right flank pain, 6mm proximal ureteral stone, proximal hydroureteronephrosis, abnormal WBC count, abnormal urinalysis suspicious for a UTI. Due to these findings concerning for a septic obstructing stone, I placed a right stent, cystoscopy, retrograde pyelogram on 9/26/18. She was discharged home. The patient called stating that getting rides back and forth is tough for her and she would like to proceed with definitive stone surgery this Wed 10/3/18.     She has been doing well as an outpatient and has been taking cipro.       Past Medical History:   Diagnosis Date    Depression        Past Surgical History:   Procedure Laterality Date    CYSTOSCOPY W/ URETERAL STENT PLACEMENT Right 9/26/2018    Procedure: CYSTOSCOPY, WITH Right URETERAL STENT INSERTION, Right Retrograde Pyelogram;  Surgeon: Irma Gill MD;  Location: [facility] OR;  Service: Urology;  Laterality: Right;    CYSTOSCOPY, WITH Right URETERAL STENT INSERTION, Right Retrograde Pyelogram Right 9/26/2018    Performed by Irma Gill MD at [facility] OR    TONSILLECTOMY         Current Outpatient Medications on File Prior to Visit   Medication Sig Dispense Refill     aspirin 325 MG tablet Take 325 mg by mouth once daily.      ciprofloxacin HCl (CIPRO) 500 MG tablet Take 1 tablet (500 mg total) by mouth 2 (two) times daily. 24 tablet 0    pantoprazole (PROTONIX) 40 MG tablet Take 1 tablet (40 mg total) by mouth once daily. 90 tablet 0    quetiapine (SEROQUEL) 100 MG Tab Take by mouth every evening.      tamsulosin (FLOMAX) 0.4 mg Cap Take 1 capsule (0.4 mg total) by mouth once daily. 30 capsule 11    venlafaxine (EFFEXOR) 50 MG Tab Take 150 mg by mouth 2 (two) times daily.       No current facility-administered medications on file prior to visit.        Review of patient's allergies indicates:  No Known Allergies    Family History     None          Tobacco Use    Smoking status: Never Smoker    Smokeless tobacco: Never Used   Substance and Sexual Activity    Alcohol use: No     Frequency: Never     Comment: stopped drinking 3 years ago    Drug use: No    Sexual activity: Not on file       Review of Systems   Constitutional: Negative for diaphoresis.   HENT: Negative for facial swelling.    Eyes: Negative for visual disturbance.   Respiratory: Negative for shortness of breath.    Cardiovascular: Negative for chest pain.   Gastrointestinal: Negative for abdominal distention.   Musculoskeletal: Negative for gait problem.   Skin: Negative for color change.   Neurological: Negative for speech difficulty.   Hematological: Does not bruise/bleed easily.   Psychiatric/Behavioral: Negative for agitation and behavioral problems.       Objective:     There were no vitals filed for this visit.      [unfilled]       Lines/Drains/Airways          None          Physical Exam   Constitutional: She is oriented to person, place, and time. She appears well-developed and well-nourished.   HENT:   Head: Normocephalic and atraumatic.   Eyes: EOM are normal. Pupils are equal, round, and reactive to light.   Neck: Normal range of motion. Neck supple.   Cardiovascular: Intact distal pulses.     Pulmonary/Chest: Effort normal. No respiratory distress.   Abdominal: Soft. She exhibits no distension. There is no tenderness.   Musculoskeletal: Normal range of motion. She exhibits no edema.   Neurological: She is alert and oriented to person, place, and time.   Skin: Skin is warm and dry.     Psychiatric: She has a normal mood and affect. Her behavior is normal.       Significant Labs:  BMP:  @LABRCNTIP(NA:3,K:3,CL:3,co2:3,bun:3,creatinine:3,labglom:3,glucose,calcium:3)@    CBC:  @LABRCNTIP(wbc:3,hgb:3,hct:3,PLT:3)@    All pertinent labs results from the past 24 hours have been reviewed.  Recent Lab Results     None          Significant Imaging:       Assessment and Plan:     1. I have explained the indication and benefits of proceeding with a right  ureteroscopy, holmium laser lithotripsy, retrograde pyelogram and stent exchange with me in the operating room on 10/3/18. Alternatives of the procedure were also discussed. The risks included but were not limited to pain, infection (urinary tract infection), bleeding (hematuria), ureteral or urethral stricture,  injury to the urethra, bladder, ureter, need for additional treatments, inability or incomplete removal of kidney stones, pain, and discomfort related to the stent were discussed in depth with the patient.  The patient understands that if I am unable to pass the cameras up to the level of the stone or if visibility is poor, then I will only place a left ureteral stent and pursue a staged procedure.     The ureteral stent was discussed at length. The patient will need to have it removed and the time period in which it should remain indwelling is to be determined after surgery. If left indwelling, the sequelae include pain, infection, lower urinary tract symptoms, development of calcifications on the ureteral stent, worsening kidney function, and complete loss of kidney function.     The patient voiced understanding and all questions have been answered and  informed consents were signed.  2. She will take cipro continuously until the surgery this Wednesday.   3. She has ride limitations and met anesthesia last week, therefore no preop anesthesia appointment necessary.     Ureteral calculus  -     Case Request Operating Room: REMOVAL, CALCULUS, URETER, URETEROSCOPIC, holmium laser lithotripsy, stent exchange, retrograde pyelogram  -     Place in Outpatient; Standing  -     Vital Signs ; Standing  -     Insert peripheral IV; Standing  -     Place sequential compression device; Standing    Nephrolithiasis  -     Case Request Operating Room: REMOVAL, CALCULUS, URETER, URETEROSCOPIC, holmium laser lithotripsy, stent exchange, retrograde pyelogram  -     Place in Outpatient; Standing  -     Vital Signs ; Standing  -     Insert peripheral IV; Standing  -     Place sequential compression device; Standing    Urinary tract infection without hematuria, site unspecified  -     Case Request Operating Room: REMOVAL, CALCULUS, URETER, URETEROSCOPIC, holmium laser lithotripsy, stent exchange, retrograde pyelogram  -     Place in Outpatient; Standing  -     Vital Signs ; Standing  -     Insert peripheral IV; Standing  -     Place sequential compression device; Standing    Other orders  -     IP VTE HIGH RISK PATIENT; Standing  -     0.9%  NaCl infusion; Inject into the vein continuous.  -     ciprofloxacin (CIPRO)400mg/200ml D5W IVPB 400 mg; Inject 200 mLs (400 mg total) into the vein On call Procedure (surgery).          Irma Gill MD  Urology  [place] - Urology

## 2018-10-02 ENCOUNTER — ANESTHESIA EVENT (OUTPATIENT)
Dept: SURGERY | Facility: HOSPITAL | Age: 73
End: 2018-10-02
Payer: MEDICARE

## 2018-10-03 ENCOUNTER — TELEPHONE (OUTPATIENT)
Dept: UROLOGY | Facility: CLINIC | Age: 73
End: 2018-10-03

## 2018-10-03 ENCOUNTER — HOSPITAL ENCOUNTER (OUTPATIENT)
Facility: HOSPITAL | Age: 73
Discharge: HOME | End: 2018-10-03
Attending: STUDENT IN AN ORGANIZED HEALTH CARE EDUCATION/TRAINING PROGRAM | Admitting: STUDENT IN AN ORGANIZED HEALTH CARE EDUCATION/TRAINING PROGRAM
Payer: MEDICARE

## 2018-10-03 ENCOUNTER — ANESTHESIA (OUTPATIENT)
Dept: SURGERY | Facility: HOSPITAL | Age: 73
End: 2018-10-03
Payer: MEDICARE

## 2018-10-03 VITALS
RESPIRATION RATE: 16 BRPM | HEIGHT: 67 IN | SYSTOLIC BLOOD PRESSURE: 122 MMHG | OXYGEN SATURATION: 97 % | BODY MASS INDEX: 21.55 KG/M2 | TEMPERATURE: 98 F | HEART RATE: 85 BPM | DIASTOLIC BLOOD PRESSURE: 78 MMHG

## 2018-10-03 DIAGNOSIS — N20.1 URETERAL CALCULUS: ICD-10-CM

## 2018-10-03 DIAGNOSIS — N39.0 URINARY TRACT INFECTION WITHOUT HEMATURIA, SITE UNSPECIFIED: ICD-10-CM

## 2018-10-03 DIAGNOSIS — N20.0 NEPHROLITHIASIS: ICD-10-CM

## 2018-10-03 LAB
BACTERIA BLD CULT: NORMAL
BACTERIA BLD CULT: NORMAL

## 2018-10-03 PROCEDURE — 76000 FLUOROSCOPY <1 HR PHYS/QHP: CPT | Mod: 26,59,, | Performed by: STUDENT IN AN ORGANIZED HEALTH CARE EDUCATION/TRAINING PROGRAM

## 2018-10-03 PROCEDURE — C2617 STENT, NON-COR, TEM W/O DEL: HCPCS | Performed by: STUDENT IN AN ORGANIZED HEALTH CARE EDUCATION/TRAINING PROGRAM

## 2018-10-03 PROCEDURE — 52356 CYSTO/URETERO W/LITHOTRIPSY: CPT | Mod: RT,,, | Performed by: STUDENT IN AN ORGANIZED HEALTH CARE EDUCATION/TRAINING PROGRAM

## 2018-10-03 PROCEDURE — 71000033 HC RECOVERY, INTIAL HOUR: Performed by: STUDENT IN AN ORGANIZED HEALTH CARE EDUCATION/TRAINING PROGRAM

## 2018-10-03 PROCEDURE — S0028 INJECTION, FAMOTIDINE, 20 MG: HCPCS | Performed by: ANESTHESIOLOGY

## 2018-10-03 PROCEDURE — 25000003 PHARM REV CODE 250: Performed by: ANESTHESIOLOGY

## 2018-10-03 PROCEDURE — 63600175 PHARM REV CODE 636 W HCPCS: Performed by: STUDENT IN AN ORGANIZED HEALTH CARE EDUCATION/TRAINING PROGRAM

## 2018-10-03 PROCEDURE — 63600175 PHARM REV CODE 636 W HCPCS: Performed by: ANESTHESIOLOGY

## 2018-10-03 PROCEDURE — 71000015 HC POSTOP RECOV 1ST HR: Performed by: STUDENT IN AN ORGANIZED HEALTH CARE EDUCATION/TRAINING PROGRAM

## 2018-10-03 PROCEDURE — 25500020 PHARM REV CODE 255: Performed by: STUDENT IN AN ORGANIZED HEALTH CARE EDUCATION/TRAINING PROGRAM

## 2018-10-03 PROCEDURE — 37000009 HC ANESTHESIA EA ADD 15 MINS: Performed by: STUDENT IN AN ORGANIZED HEALTH CARE EDUCATION/TRAINING PROGRAM

## 2018-10-03 PROCEDURE — 74420 UROGRAPHY RTRGR +-KUB: CPT | Mod: 26,,, | Performed by: STUDENT IN AN ORGANIZED HEALTH CARE EDUCATION/TRAINING PROGRAM

## 2018-10-03 PROCEDURE — 36000706: Performed by: STUDENT IN AN ORGANIZED HEALTH CARE EDUCATION/TRAINING PROGRAM

## 2018-10-03 PROCEDURE — C1769 GUIDE WIRE: HCPCS | Performed by: STUDENT IN AN ORGANIZED HEALTH CARE EDUCATION/TRAINING PROGRAM

## 2018-10-03 PROCEDURE — 36000707: Performed by: STUDENT IN AN ORGANIZED HEALTH CARE EDUCATION/TRAINING PROGRAM

## 2018-10-03 PROCEDURE — 88300 SURGICAL PATH GROSS: CPT | Performed by: PATHOLOGY

## 2018-10-03 PROCEDURE — 37000008 HC ANESTHESIA 1ST 15 MINUTES: Performed by: STUDENT IN AN ORGANIZED HEALTH CARE EDUCATION/TRAINING PROGRAM

## 2018-10-03 PROCEDURE — 88300 SURGICAL PATH GROSS: CPT | Mod: 26,,, | Performed by: PATHOLOGY

## 2018-10-03 PROCEDURE — 82365 CALCULUS SPECTROSCOPY: CPT

## 2018-10-03 PROCEDURE — 27201423 OPTIME MED/SURG SUP & DEVICES STERILE SUPPLY: Performed by: STUDENT IN AN ORGANIZED HEALTH CARE EDUCATION/TRAINING PROGRAM

## 2018-10-03 DEVICE — STENT SET URETERAL 6X26CM: Type: IMPLANTABLE DEVICE | Site: URETER | Status: FUNCTIONAL

## 2018-10-03 RX ORDER — OXYCODONE HYDROCHLORIDE 5 MG/1
5 TABLET ORAL
Status: DISCONTINUED | OUTPATIENT
Start: 2018-10-03 | End: 2018-10-03 | Stop reason: HOSPADM

## 2018-10-03 RX ORDER — ONDANSETRON 2 MG/ML
4 INJECTION INTRAMUSCULAR; INTRAVENOUS DAILY PRN
Status: DISCONTINUED | OUTPATIENT
Start: 2018-10-03 | End: 2018-10-03 | Stop reason: HOSPADM

## 2018-10-03 RX ORDER — LIDOCAINE HCL/PF 100 MG/5ML
SYRINGE (ML) INTRAVENOUS
Status: DISCONTINUED | OUTPATIENT
Start: 2018-10-03 | End: 2018-10-03

## 2018-10-03 RX ORDER — CIPROFLOXACIN 2 MG/ML
400 INJECTION, SOLUTION INTRAVENOUS
Status: COMPLETED | OUTPATIENT
Start: 2018-10-03 | End: 2018-10-03

## 2018-10-03 RX ORDER — ETOMIDATE 2 MG/ML
INJECTION INTRAVENOUS
Status: DISCONTINUED | OUTPATIENT
Start: 2018-10-03 | End: 2018-10-03

## 2018-10-03 RX ORDER — SODIUM CHLORIDE 9 MG/ML
INJECTION, SOLUTION INTRAVENOUS CONTINUOUS
Status: DISCONTINUED | OUTPATIENT
Start: 2018-10-03 | End: 2018-10-03 | Stop reason: HOSPADM

## 2018-10-03 RX ORDER — SODIUM CHLORIDE, SODIUM LACTATE, POTASSIUM CHLORIDE, CALCIUM CHLORIDE 600; 310; 30; 20 MG/100ML; MG/100ML; MG/100ML; MG/100ML
INJECTION, SOLUTION INTRAVENOUS CONTINUOUS PRN
Status: DISCONTINUED | OUTPATIENT
Start: 2018-10-03 | End: 2018-10-03

## 2018-10-03 RX ORDER — FENTANYL CITRATE 50 UG/ML
INJECTION, SOLUTION INTRAMUSCULAR; INTRAVENOUS
Status: DISCONTINUED | OUTPATIENT
Start: 2018-10-03 | End: 2018-10-03

## 2018-10-03 RX ORDER — FAMOTIDINE 10 MG/ML
20 INJECTION INTRAVENOUS ONCE
Status: COMPLETED | OUTPATIENT
Start: 2018-10-03 | End: 2018-10-03

## 2018-10-03 RX ORDER — DEXAMETHASONE SODIUM PHOSPHATE 4 MG/ML
INJECTION, SOLUTION INTRA-ARTICULAR; INTRALESIONAL; INTRAMUSCULAR; INTRAVENOUS; SOFT TISSUE
Status: DISCONTINUED | OUTPATIENT
Start: 2018-10-03 | End: 2018-10-03

## 2018-10-03 RX ORDER — HYDROMORPHONE HYDROCHLORIDE 2 MG/ML
0.5 INJECTION, SOLUTION INTRAMUSCULAR; INTRAVENOUS; SUBCUTANEOUS EVERY 5 MIN PRN
Status: DISCONTINUED | OUTPATIENT
Start: 2018-10-03 | End: 2018-10-03 | Stop reason: HOSPADM

## 2018-10-03 RX ORDER — PHENYLEPHRINE HYDROCHLORIDE 10 MG/ML
INJECTION INTRAVENOUS
Status: DISCONTINUED | OUTPATIENT
Start: 2018-10-03 | End: 2018-10-03

## 2018-10-03 RX ORDER — ONDANSETRON 2 MG/ML
INJECTION INTRAMUSCULAR; INTRAVENOUS
Status: DISCONTINUED | OUTPATIENT
Start: 2018-10-03 | End: 2018-10-03

## 2018-10-03 RX ORDER — CIPROFLOXACIN 500 MG/1
500 TABLET ORAL EVERY 12 HOURS
Qty: 24 TABLET | Refills: 0 | Status: SHIPPED | OUTPATIENT
Start: 2018-10-03 | End: 2018-10-10

## 2018-10-03 RX ORDER — MIDAZOLAM HYDROCHLORIDE 1 MG/ML
INJECTION, SOLUTION INTRAMUSCULAR; INTRAVENOUS
Status: DISCONTINUED | OUTPATIENT
Start: 2018-10-03 | End: 2018-10-03

## 2018-10-03 RX ORDER — ROCURONIUM BROMIDE 10 MG/ML
INJECTION, SOLUTION INTRAVENOUS
Status: DISCONTINUED | OUTPATIENT
Start: 2018-10-03 | End: 2018-10-03

## 2018-10-03 RX ORDER — OXYCODONE AND ACETAMINOPHEN 5; 325 MG/1; MG/1
1 TABLET ORAL EVERY 6 HOURS PRN
Qty: 20 TABLET | Refills: 0 | Status: SHIPPED | OUTPATIENT
Start: 2018-10-03

## 2018-10-03 RX ORDER — NEOSTIGMINE METHYLSULFATE 1 MG/ML
INJECTION, SOLUTION INTRAVENOUS
Status: DISCONTINUED | OUTPATIENT
Start: 2018-10-03 | End: 2018-10-03

## 2018-10-03 RX ORDER — GLYCOPYRROLATE 0.2 MG/ML
INJECTION INTRAMUSCULAR; INTRAVENOUS
Status: DISCONTINUED | OUTPATIENT
Start: 2018-10-03 | End: 2018-10-03

## 2018-10-03 RX ADMIN — PHENYLEPHRINE HYDROCHLORIDE 150 MCG: 10 INJECTION INTRAVENOUS at 12:10

## 2018-10-03 RX ADMIN — CIPROFLOXACIN 400 MG: 2 INJECTION, SOLUTION INTRAVENOUS at 11:10

## 2018-10-03 RX ADMIN — PHENYLEPHRINE HYDROCHLORIDE 100 MCG: 10 INJECTION INTRAVENOUS at 12:10

## 2018-10-03 RX ADMIN — ONDANSETRON 4 MG: 2 INJECTION, SOLUTION INTRAMUSCULAR; INTRAVENOUS at 12:10

## 2018-10-03 RX ADMIN — NEOSTIGMINE METHYLSULFATE 4.5 MG: 1 INJECTION INTRAVENOUS at 12:10

## 2018-10-03 RX ADMIN — LIDOCAINE HYDROCHLORIDE 100 MG: 20 INJECTION, SOLUTION INTRAVENOUS at 11:10

## 2018-10-03 RX ADMIN — FENTANYL CITRATE 100 MCG: 50 INJECTION, SOLUTION INTRAMUSCULAR; INTRAVENOUS at 11:10

## 2018-10-03 RX ADMIN — GLYCOPYRROLATE 0.8 MG: 0.2 INJECTION, SOLUTION INTRAMUSCULAR; INTRAVENOUS at 12:10

## 2018-10-03 RX ADMIN — SODIUM CHLORIDE, SODIUM LACTATE, POTASSIUM CHLORIDE, AND CALCIUM CHLORIDE: .6; .31; .03; .02 INJECTION, SOLUTION INTRAVENOUS at 11:10

## 2018-10-03 RX ADMIN — ROCURONIUM BROMIDE 10 MG: 10 INJECTION, SOLUTION INTRAVENOUS at 12:10

## 2018-10-03 RX ADMIN — DEXAMETHASONE SODIUM PHOSPHATE 4 MG: 4 INJECTION, SOLUTION INTRAMUSCULAR; INTRAVENOUS at 11:10

## 2018-10-03 RX ADMIN — ETOMIDATE 8 MG: 2 INJECTION, SOLUTION INTRAVENOUS at 11:10

## 2018-10-03 RX ADMIN — MIDAZOLAM 2 MG: 1 INJECTION INTRAMUSCULAR; INTRAVENOUS at 11:10

## 2018-10-03 RX ADMIN — FAMOTIDINE 20 MG: 10 INJECTION, SOLUTION INTRAVENOUS at 10:10

## 2018-10-03 RX ADMIN — FENTANYL CITRATE 50 MCG: 50 INJECTION, SOLUTION INTRAMUSCULAR; INTRAVENOUS at 12:10

## 2018-10-03 NOTE — TRANSFER OF CARE
"Anesthesia Transfer of Care Note    Patient: Viv Srivastava    Procedure(s) Performed: Procedure(s) (LRB):  REMOVAL, CALCULUS, URETER, URETEROSCOPIC, holmium laser lithotripsy, stent exchange, retrograde pyelogram (Right)  CYSTOSCOPY, WITH RETROGRADE PYELOGRAM AND URETERAL STENT INSERTION (Right)  LITHOTRIPSY, USING LASER (Right)    Patient location: PACU    Anesthesia Type: general    Transport from OR: Transported from OR on 6-10 L/min O2 by face mask with adequate spontaneous ventilation    Post pain: adequate analgesia    Post assessment: no apparent anesthetic complications    Post vital signs: stable    Level of consciousness: awake, alert and oriented    Nausea/Vomiting: no nausea/vomiting    Complications: none    Transfer of care protocol was followed      Last vitals:   Visit Vitals  /80 (BP Location: Right arm, Patient Position: Sitting)   Pulse 82   Temp 36.5 °C (97.7 °F) (Skin)   Resp 16   Ht 5' 7" (1.702 m)   SpO2 97%   Breastfeeding? No   BMI 21.55 kg/m²     "

## 2018-10-03 NOTE — TELEPHONE ENCOUNTER
----- Message from Cherie Rutledge sent at 10/3/2018  1:08 PM CDT -----  Contact: [facility] [phone]  Pharmacy would like to speak with you about verifying the directions for ciprofloxacin HCl (CIPRO) 500 MG tablet. Please advise.

## 2018-10-03 NOTE — ANESTHESIA POSTPROCEDURE EVALUATION
"Anesthesia Post Evaluation    Patient: Viv Srivastava    Procedure(s) Performed: Procedure(s) (LRB):  REMOVAL, CALCULUS, URETER, URETEROSCOPIC, holmium laser lithotripsy, stent exchange, retrograde pyelogram (Right)  CYSTOSCOPY, WITH RETROGRADE PYELOGRAM AND URETERAL STENT INSERTION (Right)  LITHOTRIPSY, USING LASER (Right)    Final Anesthesia Type: general  Patient location during evaluation: PACU  Patient participation: Yes- Able to Participate  Level of consciousness: awake and alert  Post-procedure vital signs: reviewed and stable  Pain management: adequate  Airway patency: patent  PONV status at discharge: No PONV  Anesthetic complications: no      Cardiovascular status: blood pressure returned to baseline  Respiratory status: unassisted  Hydration status: euvolemic          Visit Vitals  /77   Pulse 80   Temp 36.6 °C (97.8 °F) (Skin)   Resp 16   Ht 5' 7" (1.702 m)   SpO2 95%   Breastfeeding? No   BMI 21.55 kg/m²       Pain/Maria Eugenia Score: Pain Assessment Performed: Yes (10/3/2018  1:28 PM)  Presence of Pain: denies (10/3/2018  1:28 PM)  Maria Eugenia Score: 10 (10/3/2018  1:28 PM)        "

## 2018-10-03 NOTE — DISCHARGE SUMMARY
[facility]  Urology  Discharge Summary      Patient Name: Viv Srivastava  MRN: [id number]  Admission Date: 10/3/2018  Hospital Length of Stay: 0 days  Discharge Date: 10/03/2018  Attending Physician: Irma Gill MD   Discharging Provider: Irma Gill MD  Primary Care Physician: Dov Mcfadden Iii, MD    HPI: The patient is a 72 y.o. female with past medical history (listed below) right ureteral calculus that was obstructing, she is s/p a ureteral stent and presents today for definitive stone management.     The patient elected to proceed with the procedure(s) below. Please see H&P and/or clinic progress note(s) for full details.     Procedure(s) (LRB):  REMOVAL, CALCULUS, URETER, URETEROSCOPIC, holmium laser lithotripsy, stent exchange, retrograde pyelogram (Right)  CYSTOSCOPY, WITH RETROGRADE PYELOGRAM AND URETERAL STENT INSERTION (Right)  LITHOTRIPSY, USING LASER (Right)     Past Medical History:   Diagnosis Date    Depression        Hospital Course (synopsis of major diagnoses, care, treatment, and services provided during the course of the hospital stay): the patient tolerated the procedure well, no complications occurred intraoperatively, she will be discharged home today.       Consults: None    Significant Diagnostic Studies:      Pending Diagnostic Studies:     Procedure Component Value Units Date/Time    SURG [place] Surgery Retrograde Pyelogram [[id number]]     Order Status:  Sent Lab Status:  No result     Urinary Stone Analysis [[id number]] Collected:  10/03/18 1239    Order Status:  Sent Lab Status:  In process Updated:  10/03/18 1239    Specimen:  Urine from Stone           Final Active Diagnoses:      Problems Resolved During this Admission:    Diagnosis Date Noted Date Resolved POA    Nephrolithiasis [N20.0] 10/01/2018 10/03/2018 Yes       Discharged Condition: good    Disposition:     Follow Up:  Follow-up Information     Please follow up.    Why:  patient will need to followup  with primary care physician.                  Patient Instructions:      Diet Adult Regular     Change dressing (specify)   Order Comments: The ureteral stent is taped to the patient's inner right thigh. The patient should remove the stent on Monday 10/8/18. She should take a percocet about 30 minutes before, untape the string and then pull the string in order to remove the stent.     Activity as tolerated       Medications:  Reconciled Home Medications:      Medication List      START taking these medications    oxyCODONE-acetaminophen 5-325 mg per tablet  Commonly known as:  PERCOCET  Take 1 tablet by mouth every 6 (six) hours as needed for Pain.        CHANGE how you take these medications    ciprofloxacin HCl 500 MG tablet  Commonly known as:  CIPRO  Take 1 tablet (500 mg total) by mouth every 12 (twelve) hours for 7 days  What changed:  when to take this        CONTINUE taking these medications    aspirin 325 MG tablet  Take 325 mg by mouth once daily.     pantoprazole 40 MG tablet  Commonly known as:  PROTONIX  Take 1 tablet (40 mg total) by mouth once daily.     QUEtiapine 100 MG Tab  Commonly known as:  SEROQUEL  Take by mouth every evening.     tamsulosin 0.4 mg Cap  Commonly known as:  FLOMAX  Take 1 capsule (0.4 mg total) by mouth once daily.     venlafaxine 50 MG Tab  Commonly known as:  EFFEXOR  Take 150 mg by mouth 2 (two) times daily.            Time spent on the discharge of patient: 15 minutes    Irma Gill MD  Urology  [facility]

## 2018-10-03 NOTE — ANESTHESIA PREPROCEDURE EVALUATION
10/03/2018  Viv Srivastava is a 72 y.o., female here REMOVAL, CALCULUS, URETER, URETEROSCOPIC, holmium laser lithotripsy, stent exchange, retrograde pyelogram    Patient with depressed EF on Last ECHO. Patient has not been able to see Cardiology, due to transportation issues. Patient at increased risk for cardiac events given recent hospitalization with depressed EF.  Patient with good METS >4.  Patient had stent placed and needs stent removed.       Past Medical History:   Diagnosis Date    Depression      Past Surgical History:   Procedure Laterality Date    CYSTOSCOPY W/ URETERAL STENT PLACEMENT Right 9/26/2018    Procedure: CYSTOSCOPY, WITH Right URETERAL STENT INSERTION, Right Retrograde Pyelogram;  Surgeon: Irma Gill MD;  Location: [facility] OR;  Service: Urology;  Laterality: Right;    CYSTOSCOPY, WITH Right URETERAL STENT INSERTION, Right Retrograde Pyelogram Right 9/26/2018    Performed by Irma Gill MD at [facility] OR    TONSILLECTOMY           Anesthesia Evaluation    I have reviewed the Patient Summary Reports.    I have reviewed the Nursing Notes.   I have reviewed the Medications.     Review of Systems  Anesthesia Hx:  History of prior surgery of interest to airway management or planning:   Cardiovascular:   Exercise tolerance: good Hypertension Valvular problems/Murmurs (Mild/Moderate AS), AS CHF CONCLUSIONS     1 - Moderate aortic stenosis, SKY = 1.45 cm2, AVAi = 0.84 cm2/m2, peak velocity = 2.35 m/s, mean gradient = 13 mmHg.     2 - Severely depressed left ventricular systolic function (EF 20-25%).     3 - As compared to 07/07/17 LV dysfunction is now present.  Consider Takotsubo..    Renal/:   Chronic Renal Disease renal calculi    Hepatic/GI:   Hiatal Hernia, GERD    Psych:   Psychiatric History          Physical Exam  General:  Well nourished    Airway/Jaw/Neck:  AIRWAY FINDINGS:  Normal    Eyes/Ears/Nose:  EYES/EARS/NOSE FINDINGS: Normal    Chest/Lungs:  Chest/Lungs Clear    Heart/Vascular:  Heart Murmur  Systolic        Mental Status:  Mental Status Findings: Normal        Anesthesia Plan  Type of Anesthesia, risks & benefits discussed:  Anesthesia Type:  general  Patient's Preference:   Intra-op Monitoring Plan:   Intra-op Monitoring Plan Comments:   Post Op Pain Control Plan:   Post Op Pain Control Plan Comments:   Induction:   IV  Beta Blocker:         Informed Consent: Patient understands risks and agrees with Anesthesia plan.  Questions answered. Anesthesia consent signed with patient.  ASA Score: 3     Day of Surgery Review of History & Physical:            Ready For Surgery From Anesthesia Perspective.

## 2018-10-03 NOTE — PLAN OF CARE
Patient sitting up on side of the bed, dressed, son at bedside. Patient aaox3. Vss. Denies any pain at this time. Prescriptions delivered to the patients bedside per outpatient pharmacy. Written and verbal instructions given to the patient per MD orders with time for questioning, verbalized understanding. Patient discharged via wheelchair to the car with son.

## 2018-10-03 NOTE — OP NOTE
OPERATIVE DICTATION  DATE OF OPERATION: 10/3/2018    SERVICE: Urology    SURGEONS:  1. Irma Gill MD    ANESTHESIA:  Anesthesiologist: Garret Garcia MD  Anesthesia Resident: Miguel A Hernandes MD    STAFF:  Circulator: Irma Ojeda, OSKAR; Luna Cohen, RN  Scrub Person: Yennifer Lamar CST; ST Paul  Technician: Amari Wilkins, RT    ANESTHESIA: General    PREOPERATIVE DIAGNOSIS: Pre-Op Diagnosis Codes:     * Ureteral calculus [N20.1]     * Nephrolithiasis [N20.0]     * Urinary tract infection without hematuria, site unspecified [N39.0]     * heart failure with decreased ejection fraction    POSTOPERATIVE DIAGNOSIS: Post-Op Diagnosis Codes:     * Ureteral calculus [N20.1]     * Nephrolithiasis [N20.0]     * Urinary tract infection without hematuria, site unspecified [N39.0]     * heart failure with decreased ejection fraction    PROCEDURES:   1. right ureteroscopy, holmium laser lithotripsy, stone basket extraction  2. Cystoscopy, right retrograde pyelogram  3. Cystoscopy, exchange of right 6 Nepali by 26cm JJ ureteral stent on A STRING  4. Fluoroscopy < 1 hour  5. Interpretation of fluoroscopic images       COMPLICATIONS: * No complications entered in OR log *    DRAINS: None    TUBES: None    IMPLANTS: right 6 Belarusian x 26 cm JJ ureteral stent ON A STRING    FLUIDS: see anesthesia record     ESTIMATED BLOOD LOSS: Minimal    FINDINGS:   1. Right ureteral calculus fragmented with holmium laser fiber, small fragment sent for stone chemical analysis.  2. Right lower pole calcification is actually embedded into the papilla, therefore it was not addressed due to risk of bleeding. In order to fragment this calcification, trauma to the renal papilla would have occurred.     SPECIMEN(S):   1. Right ureteral calculus for stone chemical analysis      CONDITION: stable    INDICATIONS FOR THE PROCEDURE:  72 y.o. female with past medical history below that I first met as an inpatient consult on 9/26/18  with her first episode of kidney stones. She states she has never had kidney stones to her knowledge prior. She was experiencing worsening right flank pain and presented to [facility] ER. She also notes subjective chills.      She underwent a CT scan on 9/26/18 - CT scan reviewed - proximal 6mm ureteral calculus with hydroureteronephrosis down to the level of the calcification.      72 y.o. female with right flank pain, 6mm proximal ureteral stone, proximal hydroureteronephrosis, abnormal WBC count, abnormal urinalysis suspicious for a UTI. Due to these findings concerning for a septic obstructing stone, I placed a right stent, cystoscopy, retrograde pyelogram on 9/26/18. She was discharged home. The patient called stating that getting rides back and forth is tough for her and she would like to proceed with definitive stone surgery this Wed 10/3/18.      She has been doing well as an outpatient and has been taking cipro.      I have explained the indication and benefits of proceeding with a right  ureteroscopy, holmium laser lithotripsy, retrograde pyelogram and stent exchange with me in the operating room on 10/3/18. Alternatives of the procedure were also discussed. The risks included but were not limited to pain, infection (urinary tract infection), bleeding (hematuria), ureteral or urethral stricture,  injury to the urethra, bladder, ureter, need for additional treatments, inability or incomplete removal of kidney stones, pain, and discomfort related to the stent were discussed in depth with the patient.  The patient understands that if I am unable to pass the cameras up to the level of the stone or if visibility is poor, then I will only place a left ureteral stent and pursue a staged procedure.      The ureteral stent was discussed at length. The patient will need to have it removed and the time period in which it should remain indwelling is to be determined after surgery. If left indwelling, the sequelae  include pain, infection, lower urinary tract symptoms, development of calcifications on the ureteral stent, worsening kidney function, and complete loss of kidney function.      The patient voiced understanding and all questions have been answered and informed consents were signed today in outpatient surgery. She will take cipro continuously until the surgery this Wednesday.     PROCEDURE IN DETAIL:  After appropriate informed consent was obtained, the patient was taken to the operating room and placed in the supine position. After induction of General, she was placed in the dorsal lithotomy position.  Then she was prepped and draped in the usual sterile fashion.     Thereafter a WHO timeout was performed and the procedure was initiated. The rigid 22 Khmer cystoscope was inserted into the bladder via the urethra. I visualized the right ureteral stent emanating from the right ureteral orifice. I cannulated the orifice with a Motion guidewire. I advanced the wire until I noted a coil in the renal pelvis fluoroscopically alongside the indwelling right ureteral stent.     The cystoscopic graspers were then advanced through the cystoscope to ensnare the indwelling right ureteral stent and remove the stent for gross ID only.     I kept the guidewire in place and advanced a rigid ureteroscope into the right ureter and inspected as proximally as possible with the rigid scope. I identified a stone in the mid ureter consistent with her preoperative CT scan. The stone demonstrated surrounding ureteral mucosa minor changes where the stone likely was residing.     I used a 273 micron holmium laser fiber in order to address this stone and dust the stone until only a small fragment remained. This small fragment was ensnared with the basket and sent for stone chemical analysis.     Afterwards I advanced a second guidewire, the amplatz superstiff guidewire, into the right renal pelvis under fluoroscopic guidance through the rigid  ureteroscope. This was noted to coil alongside the first guidewire. I advanced a 35cm ureteral access sheath into the ureter until it was passed as proximally as possible. Then the inner obturator was removed.     I advanced the flexible ureteroscope through the access sheath and into the right renal pelvis. The proximal ureter was noted to be quite tortuous but absent of a ureteral stricture. I inspected the upper mid and pole calyces and did not identify a free-floating stone. In the lower pole, I identified a right lower pole calcification but it was not free floating, this calcification was actually embedded into the papille. I did not address this stone due to risk of bleeding. In order to fragment this calcification, trauma to the renal papilla would have occurred.       Afterwards I performed a right retrograde pyelogram by injecting dilute isovue through the flexible ureteroscope. I mapped out all of the other calyces and did not identify any additional stones that needed to be addressed. .     I removed the flexible ureteroscope and the access sheath together. As the sheath was being removed, the entire length of the ureter was inspected. There were no areas of trauma or bleeding identified. No additional calculi were encountered.     Then we proceeded with the right ureteral stent placement. A 6 Vincentian x 26 cm JJ ureteral stent was advanced over the guidewire. Using the radiopaque marker of the pusher the stent was positioned in the correct location. As the guidewire was being removed, the right proximal coil in the renal pelvis was formed and visualized fluoroscopically. A coil in the bladder was also noted fluoroscopically. The string was secured to the patient's right inner thigh with a mastisol and tegederm.     The rigid cystoscope was re-inserted into the bladder via the urethra to drain the bladder of all urinary contents and the distal coil of the stent was visualized directly and this concluded  the procedure.     ATTENDING ATTESTATION  I was present and scrubbed for the entire duration  of the procedure.      CASE DURATION:  * Missing case tracking time(s) *    DISPOSITION:   The patient tolerated the procedure well, she was extubated, and taken to post-anesthesia care unit in satisfactory condition.  The patient will be discharged home today. She will remove the stent which is on a string secured to her thigh on Monday 10/8/18.     Irma Gill MD

## 2018-10-03 NOTE — TELEPHONE ENCOUNTER
Spoke with Pharmacy about [facility], she had already been granted authorization and confirmed instructions.

## 2018-10-03 NOTE — DISCHARGE INSTRUCTIONS
***The ureteral stent is taped to the patient's inner right thigh. The patient should remove the stent on Monday 10/8/18. She should take a percocet about 30 minutes before, untape the string and then pull the string in order to remove the stent.     DIET: You may resume your home diet. If nausea is present, increase your diet gradually with fluids and bland foods    ACTIVITY LEVEL: You have received sedation or an anesthetic, you may feel sleepy for several hours. Rest until you are more awake. Gradually resume your normal activities    Medications: Pain medication should be taken only if needed and as directed. If antibiotics are prescribed, the medication should be taken until completed. You will be given an updated list of you medications.    No driving, alcoholic beverages or signing legal documents for next 24 hours or while taking pain medication.       CALL THE DOCTOR:    For any obvious bleeding (some dried blood over the incision is normal).      Redness, swelling, foul smell around incision or fever over 101.   Shortness of breath, Coughing up Bloody sputum, Pains or Swelling in your Calves .   Persistent pain or nausea not relieved by medication.    If any unusual problems or difficulties occur contact your doctor. If you cannot contact your doctor but feel your signs and symptoms warrant a physicians attention return to the emergency room.    ANESTHESIA  -For the first 24 hours after surgery:  Do not drive, use heavy equipment, make important decisions, or drink alcohol  -It is normal to feel sleepy for several hours.  Rest until you are more awake.  -Have someone stay with you, if needed.  They can watch for problems and help keep you safe.  -Some possible post anesthesia side effects include: nausea and vomiting, sore throat and hoarseness, sleepiness, and dizziness.    PAIN  -If you have pain after surgery, pain medicine will help you feel better.  Take it as directed, before pain becomes  severe.  Most pain relievers taken by mouth need at least 20-30 minutes to start working.  -Do not drive or drink alcohol while taking pain medicine.  -Pain medication can upset your stomach.  Taking them with a little food may help.  -Other ways to help control pain: elevation, ice, and relaxation  -Call your surgeon if still having unmanageable pain an hour after taking pain medicine.  -Pain medicine can cause constipation.  Taking an over-the counter stool softener while on prescription pain medicine and drinking plenty of fluids can prevent this side effect.  -Call your surgeon if you have severe side effects like: breathing problems, trouble waking up, dizziness, confusion, or severe constipation.    NAUSEA  -Some people have nausea after surgery.  This is often because of anesthesia, pain, pain medicine, or the stress of surgery.  -Do not push yourself to eat.  Start off with clear liquids and soup.  Slowly move to solid foods.  Don't eat fatty, rich, spicy foods at first.  Eat smaller amounts.  -If you develop persistent nausea and vomiting please notify your surgeon immediately.    BLEEDING  -Different types of surgery require different types of care and dressing changes.  It is important to follow all instructions and advice from your surgeon.  Change dressing as directed.  Call your surgeon for any concerns regarding postop bleeding.    SIGNS OF INFECTION  -Signs of infection include: fever, swelling, drainage, and redness  -Notify your surgeon if you have a fever of 100.4 F (38.0 C) or higher.  -Notify your surgeon if you notice redness, swelling, increased pain, pus, or a foul smell at the incision site.

## 2018-10-05 LAB
ANNOTATION COMMENT IMP: NORMAL
COMPN STONE: NORMAL
SPECIMEN SOURCE: NORMAL
STONE ANALYSIS IR-IMP: NORMAL

## 2018-10-08 ENCOUNTER — TELEPHONE (OUTPATIENT)
Dept: UROLOGY | Facility: CLINIC | Age: 73
End: 2018-10-08

## 2018-10-08 NOTE — TELEPHONE ENCOUNTER
Ms. Srivastava,  I attempted to call all of the phone numbers I had on file for you however I was not able to reach you at your home phone or your son at his contact numbers.     I wanted to make sure that you were able to remove the stent which was on a string on Monday 10/8/18.    Secondly I wanted to give you your stone analysis results. I printed it on the next page but it was a Calcium oxalate monohydrate stone. Please see the following below to try to prevent kidney stones.     Strategy for stone prevention-  - try to limit salt and red meat intake  - stone formers are encouraged to drink 2-3 liters of water a day  - adding citrate to water in the form of yaneth, lemon juice, or crystal light has been shown to be preventative of kidney stones  - limit iced tea and randy   - limit foods high in oxalate this will be especially important for you because of the type of kidney stone you had based off of the stone analysis. I have included a starter list of foods high in oxalate to avoid and foods lower in oxalate to substitute it with.     Please call my clinic if you have any questions [phone].  Sincerely,  Irma Gill MD

## 2019-08-23 ENCOUNTER — HOSPITAL ENCOUNTER (EMERGENCY)
Facility: HOSPITAL | Age: 74
Discharge: HOME | End: 2019-08-23
Attending: SURGERY
Payer: MEDICARE

## 2019-08-23 VITALS
BODY MASS INDEX: 18.79 KG/M2 | DIASTOLIC BLOOD PRESSURE: 96 MMHG | TEMPERATURE: 99 F | SYSTOLIC BLOOD PRESSURE: 181 MMHG | OXYGEN SATURATION: 99 % | RESPIRATION RATE: 18 BRPM | WEIGHT: 124 LBS | HEART RATE: 73 BPM | HEIGHT: 68 IN

## 2019-08-23 DIAGNOSIS — M79.5 FOREIGN BODY (FB) IN SOFT TISSUE: Primary | ICD-10-CM

## 2019-08-23 PROCEDURE — 90471 IMMUNIZATION ADMIN: CPT | Mod: ER | Performed by: SURGERY

## 2019-08-23 PROCEDURE — 63600175 PHARM REV CODE 636 W HCPCS: Mod: ER | Performed by: SURGERY

## 2019-08-23 PROCEDURE — 25000003 PHARM REV CODE 250: Mod: ER | Performed by: SURGERY

## 2019-08-23 PROCEDURE — 90715 TDAP VACCINE 7 YRS/> IM: CPT | Mod: ER | Performed by: SURGERY

## 2019-08-23 PROCEDURE — 10120 INC&RMVL FB SUBQ TISS SMPL: CPT | Mod: F1,ER

## 2019-08-23 PROCEDURE — 99283 EMERGENCY DEPT VISIT LOW MDM: CPT | Mod: 25,ER

## 2019-08-23 RX ORDER — IBUPROFEN 400 MG/1
800 TABLET ORAL
Status: COMPLETED | OUTPATIENT
Start: 2019-08-23 | End: 2019-08-23

## 2019-08-23 RX ORDER — IBUPROFEN 800 MG/1
800 TABLET ORAL EVERY 6 HOURS PRN
Qty: 20 TABLET | Refills: 0 | Status: SHIPPED | OUTPATIENT
Start: 2019-08-23

## 2019-08-23 RX ORDER — CEPHALEXIN 500 MG/1
500 CAPSULE ORAL EVERY 8 HOURS
Qty: 15 CAPSULE | Refills: 0 | Status: SHIPPED | OUTPATIENT
Start: 2019-08-23 | End: 2019-08-28

## 2019-08-23 RX ORDER — CEPHALEXIN 500 MG/1
500 CAPSULE ORAL
Status: COMPLETED | OUTPATIENT
Start: 2019-08-23 | End: 2019-08-23

## 2019-08-23 RX ADMIN — BACITRACIN ZINC NEOMYCIN SULFATE POLYMYXIN B SULFATE: 400; 3.5; 5 OINTMENT TOPICAL at 11:08

## 2019-08-23 RX ADMIN — CEPHALEXIN 500 MG: 500 CAPSULE ORAL at 11:08

## 2019-08-23 RX ADMIN — CLOSTRIDIUM TETANI TOXOID ANTIGEN (FORMALDEHYDE INACTIVATED), CORYNEBACTERIUM DIPHTHERIAE TOXOID ANTIGEN (FORMALDEHYDE INACTIVATED), BORDETELLA PERTUSSIS TOXOID ANTIGEN (GLUTARALDEHYDE INACTIVATED), BORDETELLA PERTUSSIS FILAMENTOUS HEMAGGLUTININ ANTIGEN (FORMALDEHYDE INACTIVATED), BORDETELLA PERTUSSIS PERTACTIN ANTIGEN, AND BORDETELLA PERTUSSIS FIMBRIAE 2/3 ANTIGEN 0.5 ML: 5; 2; 2.5; 5; 3; 5 INJECTION, SUSPENSION INTRAMUSCULAR at 11:08

## 2019-08-23 RX ADMIN — IBUPROFEN 800 MG: 400 TABLET, FILM COATED ORAL at 11:08

## 2019-08-23 NOTE — ED PROVIDER NOTES
Encounter Date: 8/23/2019       History     Chief Complaint   Patient presents with    fishhook in finger     Pt states was fishing and caught left index finger with fishing hook.  Hook noted still in place.      Fishing hook in left index finger prior to admission    The history is provided by the patient.   Foreign Body    The current episode started today. The incident was witnessed. The incident was witnessed/reported by the patient.     Review of patient's allergies indicates:  No Known Allergies  Past Medical History:   Diagnosis Date    Depression      Past Surgical History:   Procedure Laterality Date    CYSTOSCOPY, WITH RETROGRADE PYELOGRAM AND URETERAL STENT INSERTION Right 10/3/2018    Performed by Irma Gill MD at [facility] OR    CYSTOSCOPY, WITH Right URETERAL STENT INSERTION, Right Retrograde Pyelogram Right 9/26/2018    Performed by Irma Gill MD at [facility] OR    LITHOTRIPSY, USING LASER Right 10/3/2018    Performed by Irma Gill MD at [facility] OR    REMOVAL, CALCULUS, URETER, URETEROSCOPIC, holmium laser lithotripsy, stent exchange, retrograde pyelogram Right 10/3/2018    Performed by Irma Gill MD at [facility] OR    TONSILLECTOMY       No family history on file.  Social History     Tobacco Use    Smoking status: Never Smoker    Smokeless tobacco: Never Used   Substance Use Topics    Alcohol use: No     Frequency: Never     Comment: stopped drinking 3 years ago    Drug use: No     Review of Systems   Constitutional: Negative.    HENT: Negative.    Eyes: Negative.    Respiratory: Negative.    Cardiovascular: Negative.    Gastrointestinal: Negative.    Endocrine: Negative.    Genitourinary: Negative.    Musculoskeletal: Negative.    Skin: Positive for wound.   Allergic/Immunologic: Negative.    Neurological: Negative.    Hematological: Does not bruise/bleed easily.   Psychiatric/Behavioral: Negative.        Physical Exam     Initial Vitals [08/23/19 1132]   BP Pulse Resp Temp SpO2   (!)  181/96 73 18 98.8 °F (37.1 °C) 99 %      MAP       --         Physical Exam    Nursing note and vitals reviewed.  Constitutional: She appears well-developed and well-nourished.   Musculoskeletal:        Hands:  Fish hook in pulp of left index finger volar surface         ED Course   Foreign Body  Date/Time: 8/23/2019 12:34 PM  Performed by: ALFONSO Castanon III, MD  Authorized by: ALFONSO Castanon III, MD   Consent Done: Yes  Consent: Verbal consent obtained.  Risks and benefits: risks, benefits and alternatives were discussed  Body area: skin  General location: upper extremity  Location details: left index finger  Anesthesia: local infiltration    Anesthesia:  Local Anesthetic: lidocaine 1% without epinephrine  Patient sedated: no  Patient restrained: no  Tendon involvement: none  Complexity: simple  Objects recovered: Fish hook  Post-procedure assessment: foreign body removed  Patient tolerance: Patient tolerated the procedure well with no immediate complications      Labs Reviewed - No data to display       Imaging Results    None          Medical Decision Making:   Initial Assessment:   Fish hook removed left index  ED Management:  Tetanus given antibiotics given wound dressed and clean                      Clinical Impression:       ICD-10-CM ICD-9-CM   1. Foreign body (FB) in soft tissue M79.5 729.6         Disposition:   Disposition: Discharged  Condition: Stable                        ALFONSO Castanon III, MD  08/23/19 7471

## 2022-10-10 NOTE — ED PROVIDER NOTES
"Encounter Date: 9/26/2018       History     Chief Complaint   Patient presents with    Flank Pain     Pt states started with pain to bilateral sides of lower back x 6 days.  Denies urinary frequency, denies burning with urination, denies vaginal d/c.  States last BM 2 days ago.  Pt states "could you just give me a pain pill."  Pt denies taking any medications this am.  Pt drowsy in triage.      72-year-old female presents with chief complaint of right flank pain.  Patient reports fever and chills for the last 4-5 days.  Reports the symptoms started Friday or Saturday.  Son reports the symptoms have been waxing and waning initially for the 1st 2 days felt bad but then started feeling better was of ambulating around could talking on yesterday.  Reported when awoke this morning felt significantly worse.  Reports it felt weak felt like she was going to pass out.  Does report frequent urination.  Denies any burning or pain with urination.  Denies any discharge from vagina.  Denies any known allergies.  Son also reports the mother stated she thought she had the flu.          Review of patient's allergies indicates:  No Known Allergies  Past Medical History:   Diagnosis Date    Depression      Past Surgical History:   Procedure Laterality Date    TONSILLECTOMY       History reviewed. No pertinent family history.  Social History     Tobacco Use    Smoking status: Never Smoker    Smokeless tobacco: Never Used   Substance Use Topics    Alcohol use: No     Frequency: Never     Comment: stopped drinking 3 years ago    Drug use: No     Review of Systems   Constitutional: Positive for activity change, appetite change, chills and fever.   Gastrointestinal: Negative for abdominal pain, nausea and vomiting.   Genitourinary: Positive for flank pain and frequency.   All other systems reviewed and are negative.      Physical Exam     Initial Vitals [09/26/18 0723]   BP Pulse Resp Temp SpO2   (!) 59/43 (!) 124 20 98.2 °F (36.8 " °C) (!) 94 %      MAP       --         Physical Exam    Nursing note and vitals reviewed.  Constitutional: She appears well-developed and well-nourished.   HENT:   Head: Normocephalic and atraumatic.   Eyes: EOM are normal. Pupils are equal, round, and reactive to light.   Neck: Normal range of motion. Neck supple.   Cardiovascular: Normal rate, regular rhythm and normal heart sounds.   Pulmonary/Chest: Breath sounds normal.   Abdominal: Soft. Bowel sounds are normal.   Musculoskeletal: Normal range of motion. She exhibits tenderness.   Neurological: She is alert and oriented to person, place, and time. GCS score is 15. GCS eye subscore is 4. GCS verbal subscore is 5. GCS motor subscore is 6.   Skin: Skin is dry. Capillary refill takes less than 2 seconds.   Psychiatric: She has a normal mood and affect. Her behavior is normal. Thought content normal.         ED Course   Procedures  Labs Reviewed   CBC W/ AUTO DIFFERENTIAL - Abnormal; Notable for the following components:       Result Value    WBC 1.60 (*)     RDW 15.0 (*)     All other components within normal limits    Narrative:       WBC critical result(s) called and verbal readback obtained from   [employer] 09/26/2018 08:24   COMPREHENSIVE METABOLIC PANEL - Abnormal; Notable for the following components:    CO2 20 (*)     Glucose 116 (*)     Alkaline Phosphatase 289 (*)     eGFR if non  53.4 (*)     All other components within normal limits   LACTIC ACID, PLASMA - Abnormal; Notable for the following components:    Lactate (Lactic Acid) 4.1 (*)     All other components within normal limits    Narrative:     Lactic Acid critical result(s) called and verbal readback obtained   from [employer] (R.N.) , 09/26/2018 08:16   URINALYSIS, REFLEX TO URINE CULTURE - Abnormal; Notable for the following components:    Appearance, UA Hazy (*)     Protein, UA 2+ (*)     Occult Blood UA 3+ (*)     Leukocytes, UA 3+ (*)     All other components within  normal limits    Narrative:     Preferred Collection Type->Urine, Clean Catch   URINALYSIS MICROSCOPIC - Abnormal; Notable for the following components:    RBC, UA 30 (*)     WBC, UA 20 (*)     Bacteria, UA Many (*)     All other components within normal limits    Narrative:     Preferred Collection Type->Urine, Clean Catch   ISTAT PROCEDURE - Abnormal; Notable for the following components:    POC PCO2 30.4 (*)     POC PO2 34 (*)     POC HCO3 20.6 (*)     POC SATURATED O2 70 (*)     POC TCO2 22 (*)     All other components within normal limits   CULTURE, BLOOD   CULTURE, BLOOD   CULTURE, URINE   TROPONIN I   INFLUENZA A AND B ANTIGEN   LACTIC ACID, PLASMA     EKG Readings: (Independently Interpreted)   Initial Reading: No STEMI. Rhythm: Sinus Tachycardia.   Normal sinus rhythm at 91 beats per minute normal CO interval normal QRS duration no acute ST segment changes noted       Imaging Results          CT Renal Stone Study ABD Pelvis WO (Final result)  Result time 09/26/18 08:36:07    Final result by Carlos Liriano III, MD (09/26/18 08:36:07)                 Impression:      1.  Moderate hydronephrosis involves the right kidney with evidence of a nonobstructing stone in the lower pole.  There is a 6 mm calcification inferior 0 medial to the right kidney.  Although poorly defined, I suspect this is secondary to a stone at the ureteropelvic junction.  Follow-up studies recommended.    2.  Moderately large hiatal hernia.    3.  Otherwise as above.  No bowel obstruction or free air.  Follow-up recommended.    All CT scans at this facility are performed  using dose modulation techniques as appropriate to performed exam including the following:  automated exposure control; adjustment of mA and/or kV according to the patients size (this includes techniques or standardized protocols for targeted exams where dose is matched to indication/reason for exam: i.e. extremities or head);  iterative reconstruction  Back pain technique.      Electronically signed by: Carlos Liriano MD  Date:    09/26/2018  Time:    08:36             Narrative:    EXAMINATION:  CT RENAL STONE STUDY ABD PELVIS WO    CLINICAL HISTORY:  right flank pain;    TECHNIQUE:  Axial CT images performed through the abdomen and pelvis without intravenous contrast. Multiplanar reformats were performed and interpreted.    COMPARISON:  None    FINDINGS:  The heart size is borderline.  There is basilar scarring and/or atelectatic change.  There is no free intraperitoneal air.  No kwame bowel obstruction.  Bony windows show no acute abnormality.  There is a small pericardial effusion.  Moderately large hiatal hernia noted.  The liver and spleen are unremarkable.  There is no definite adrenal mass or gross enlargement.  Pancreas is poorly defined but there is no discrete mass.  Left kidney shows no obstruction.  Right kidney is enlarged with evidence of moderate hydronephrosis.  There is a 5 mm stone in the mid to lower pole of the right kidney the right renal pelvis is distended although the detail about the pelvis and proximal ureter is somewhat limited, there is a calcification in this region measuring 6 mm in diameter which is suspect represents stone at the ureteropelvic junction.  Borderline bladder wall thickening.    In the right lower quadrant there is no definite inflammatory mass to suggest acute appendicitis.  Moderate volume of stool in the colon.                               X-Ray Chest AP Portable (Final result)  Result time 09/26/18 08:02:51    Final result by Carlos Liriano III, MD (09/26/18 08:02:51)                 Impression:      Borderline heart size.  Clear lungs.      Electronically signed by: Carlos Liriano MD  Date:    09/26/2018  Time:    08:02             Narrative:    EXAMINATION:  XR CHEST AP PORTABLE    CLINICAL HISTORY:  Sepsis;    COMPARISON:  [facility]    FINDINGS:  Heart size is borderline with moderate tortuosity of  the thoracic aorta.  Patient is rotated but the lung fields are grossly clear.                                 Medical Decision Making:   Differential Diagnosis:   Kidney stone, pyelonephritis, cholecystitis, bowel obstruction, vascular injury, sepsis.  Independently Interpreted Test(s):   I have ordered and independently interpreted X-rays - see prior notes.  I have ordered and independently interpreted EKG Reading(s) - see prior notes  Clinical Tests:   Lab Tests: Ordered and Reviewed  The following lab test(s) were unremarkable: CBC, Lactate, CMP, Troponin and Urinalysis       <> Summary of Lab: Severe lactic acidosis  Radiological Study: Ordered and Reviewed  Medical Tests: Ordered and Reviewed  Sepsis Perfusion Assessment: I attest, a sepsis perfusion exam was performed within 6 hours of Septic Shock presentation, following fluid resuscitation.  ED Management:  Patient initially presented severely hypotensive after receiving approximately 1200 cc of fluid blood pressure stabilized.  Upon re-evaluation has remained stable for about 2 hr.  Other:   I have discussed this case with another health care provider.       <> Summary of the Discussion: Plan to admit the patient to the hospital with severely elevated lactic acid as well as severe hypotension presentation.                      Clinical Impression:   The primary encounter diagnosis was Sepsis, due to unspecified organism. Diagnoses of Hypotension, Urinary tract infection without hematuria, site unspecified, and Hydronephrosis, right were also pertinent to this visit.                             Moreno Fierro MD  09/26/18 [id number]     Back pain

## (undated) DEVICE — GUIDE WIRE MOTION .035 X 150CM

## (undated) DEVICE — JELLY KY LUBRICATING 5G PACKET

## (undated) DEVICE — SEE MEDLINE ITEM 152622

## (undated) DEVICE — SYR ONLY LUER LOCK 20CC

## (undated) DEVICE — GAUZE SPONGE 4X4 12PLY

## (undated) DEVICE — SEE MEDLINE ITEM 154981

## (undated) DEVICE — SEE MEDLINE ITEM 157117

## (undated) DEVICE — GUIDEWIRE AMPLATZ .035IN 145CM

## (undated) DEVICE — SEE MEDLINE ITEM 157116

## (undated) DEVICE — SEE MEDLINE ITEM 152532

## (undated) DEVICE — SUPPORT ULNA NERVE PROTECTOR

## (undated) DEVICE — UNDERGLOVES BIOGEL PI SZ 7 LF

## (undated) DEVICE — SET IRR URLGY 2LINE UNIV SPIKE

## (undated) DEVICE — BAG LINGEMAN DRAIN UROLOGY

## (undated) DEVICE — EXTRACTOR TIPLESS 2.4FRX1115CM

## (undated) DEVICE — GLOVE SURGICAL LATEX SZ 6.5

## (undated) DEVICE — Device

## (undated) DEVICE — SEE MEDLINE ITEM 157185

## (undated) DEVICE — JELLY LUBRICANT STERILE 4 OZ

## (undated) DEVICE — FIBER LASER HOLMIUM 273 MICRON

## (undated) DEVICE — SOL IRR WATER STRL 3000 ML

## (undated) DEVICE — SODIUM BICARBONATE 50 POUNDS